# Patient Record
Sex: FEMALE | Race: WHITE | NOT HISPANIC OR LATINO | Employment: OTHER | URBAN - NONMETROPOLITAN AREA
[De-identification: names, ages, dates, MRNs, and addresses within clinical notes are randomized per-mention and may not be internally consistent; named-entity substitution may affect disease eponyms.]

---

## 2017-05-12 ENCOUNTER — COMMUNICATION - GICH (OUTPATIENT)
Dept: PHARMACY | Facility: OTHER | Age: 62
End: 2017-05-12

## 2017-05-12 DIAGNOSIS — E78.00 PURE HYPERCHOLESTEROLEMIA: ICD-10-CM

## 2017-05-12 DIAGNOSIS — E03.9 HYPOTHYROIDISM: ICD-10-CM

## 2017-05-12 DIAGNOSIS — F34.1 DYSTHYMIC DISORDER: ICD-10-CM

## 2017-06-06 ENCOUNTER — COMMUNICATION - GICH (OUTPATIENT)
Dept: FAMILY MEDICINE | Facility: OTHER | Age: 62
End: 2017-06-06

## 2017-06-06 DIAGNOSIS — E03.9 HYPOTHYROIDISM: ICD-10-CM

## 2017-06-06 DIAGNOSIS — F34.1 DYSTHYMIC DISORDER: ICD-10-CM

## 2017-08-08 ENCOUNTER — HISTORY (OUTPATIENT)
Dept: FAMILY MEDICINE | Facility: OTHER | Age: 62
End: 2017-08-08

## 2017-08-08 ENCOUNTER — OFFICE VISIT - GICH (OUTPATIENT)
Dept: FAMILY MEDICINE | Facility: OTHER | Age: 62
End: 2017-08-08

## 2017-08-08 DIAGNOSIS — Z12.11 ENCOUNTER FOR SCREENING FOR MALIGNANT NEOPLASM OF COLON: ICD-10-CM

## 2017-08-08 DIAGNOSIS — F34.1 DYSTHYMIC DISORDER: ICD-10-CM

## 2017-08-08 DIAGNOSIS — E03.9 HYPOTHYROIDISM: ICD-10-CM

## 2017-08-08 DIAGNOSIS — Z23 ENCOUNTER FOR IMMUNIZATION: ICD-10-CM

## 2017-08-08 DIAGNOSIS — E78.00 PURE HYPERCHOLESTEROLEMIA: ICD-10-CM

## 2017-08-08 DIAGNOSIS — Z12.4 ENCOUNTER FOR SCREENING FOR MALIGNANT NEOPLASM OF CERVIX: ICD-10-CM

## 2017-08-08 DIAGNOSIS — Z00.00 ENCOUNTER FOR GENERAL ADULT MEDICAL EXAMINATION WITHOUT ABNORMAL FINDINGS: ICD-10-CM

## 2017-08-08 LAB
A/G RATIO - HISTORICAL: 1.4 (ref 1–2)
ALBUMIN SERPL-MCNC: 4.2 G/DL (ref 3.5–5.7)
ALP SERPL-CCNC: 54 IU/L (ref 34–104)
ALT (SGPT) - HISTORICAL: 22 IU/L (ref 7–52)
ANION GAP - HISTORICAL: 8 (ref 5–18)
AST SERPL-CCNC: 22 IU/L (ref 13–39)
BILIRUB SERPL-MCNC: 0.6 MG/DL (ref 0.3–1)
BUN SERPL-MCNC: 17 MG/DL (ref 7–25)
BUN/CREAT RATIO - HISTORICAL: 22
CALCIUM SERPL-MCNC: 9.4 MG/DL (ref 8.6–10.3)
CHLORIDE SERPLBLD-SCNC: 100 MMOL/L (ref 98–107)
CHOL/HDL RATIO - HISTORICAL: 3.25
CHOLESTEROL TOTAL: 185 MG/DL
CO2 SERPL-SCNC: 27 MMOL/L (ref 21–31)
CREAT SERPL-MCNC: 0.77 MG/DL (ref 0.7–1.3)
GFR IF NOT AFRICAN AMERICAN - HISTORICAL: >60 ML/MIN/1.73M2
GLOBULIN - HISTORICAL: 3 G/DL (ref 2–3.7)
GLUCOSE SERPL-MCNC: 93 MG/DL (ref 70–105)
HDLC SERPL-MCNC: 57 MG/DL (ref 23–92)
LDLC SERPL CALC-MCNC: 109 MG/DL
NON-HDL CHOLESTEROL - HISTORICAL: 128 MG/DL
PATIENT STATUS - HISTORICAL: ABNORMAL
POTASSIUM SERPL-SCNC: 4.2 MMOL/L (ref 3.5–5.1)
PROT SERPL-MCNC: 7.2 G/DL (ref 6.4–8.9)
SODIUM SERPL-SCNC: 135 MMOL/L (ref 133–143)
TRIGL SERPL-MCNC: 95 MG/DL
TSH - HISTORICAL: 2.41 UIU/ML (ref 0.34–5.6)

## 2017-08-09 ENCOUNTER — COMMUNICATION - GICH (OUTPATIENT)
Dept: SURGERY | Facility: OTHER | Age: 62
End: 2017-08-09

## 2017-08-09 DIAGNOSIS — Z12.11 ENCOUNTER FOR SCREENING FOR MALIGNANT NEOPLASM OF COLON: ICD-10-CM

## 2017-08-11 ENCOUNTER — HISTORY (OUTPATIENT)
Dept: RADIOLOGY | Facility: OTHER | Age: 62
End: 2017-08-11

## 2017-08-11 ENCOUNTER — HOSPITAL ENCOUNTER (OUTPATIENT)
Dept: RADIOLOGY | Facility: OTHER | Age: 62
End: 2017-08-11
Attending: FAMILY MEDICINE

## 2017-08-11 DIAGNOSIS — Z12.31 ENCOUNTER FOR SCREENING MAMMOGRAM FOR MALIGNANT NEOPLASM OF BREAST: ICD-10-CM

## 2017-08-14 ENCOUNTER — AMBULATORY - GICH (OUTPATIENT)
Dept: RADIOLOGY | Facility: OTHER | Age: 62
End: 2017-08-14

## 2017-08-14 DIAGNOSIS — R92.8 OTHER ABNORMAL AND INCONCLUSIVE FINDINGS ON DIAGNOSTIC IMAGING OF BREAST: ICD-10-CM

## 2017-08-17 LAB — HPV RESULTS - HISTORICAL: NEGATIVE

## 2017-08-18 ENCOUNTER — HISTORY (OUTPATIENT)
Dept: RADIOLOGY | Facility: OTHER | Age: 62
End: 2017-08-18

## 2017-08-18 ENCOUNTER — HOSPITAL ENCOUNTER (OUTPATIENT)
Dept: RADIOLOGY | Facility: OTHER | Age: 62
End: 2017-08-18
Attending: FAMILY MEDICINE

## 2017-08-18 DIAGNOSIS — R92.8 OTHER ABNORMAL AND INCONCLUSIVE FINDINGS ON DIAGNOSTIC IMAGING OF BREAST: ICD-10-CM

## 2017-10-30 ENCOUNTER — COMMUNICATION - GICH (OUTPATIENT)
Dept: FAMILY MEDICINE | Facility: OTHER | Age: 62
End: 2017-10-30

## 2017-11-28 ENCOUNTER — AMBULATORY - GICH (OUTPATIENT)
Dept: FAMILY MEDICINE | Facility: OTHER | Age: 62
End: 2017-11-28

## 2017-11-28 DIAGNOSIS — Z23 ENCOUNTER FOR IMMUNIZATION: ICD-10-CM

## 2017-12-08 ENCOUNTER — SURGERY (OUTPATIENT)
Dept: SURGERY | Facility: OTHER | Age: 62
End: 2017-12-08

## 2017-12-08 ENCOUNTER — HOSPITAL ENCOUNTER (OUTPATIENT)
Dept: SURGERY | Facility: OTHER | Age: 62
Discharge: HOME | End: 2017-12-08
Attending: SURGERY | Admitting: SURGERY

## 2017-12-08 ENCOUNTER — HISTORY (OUTPATIENT)
Dept: SURGERY | Facility: OTHER | Age: 62
End: 2017-12-08

## 2017-12-08 ENCOUNTER — TRANSFERRED RECORDS (OUTPATIENT)
Dept: MULTI SPECIALTY CLINIC | Facility: CLINIC | Age: 62
End: 2017-12-08

## 2017-12-13 ENCOUNTER — HISTORY (OUTPATIENT)
Dept: SURGERY | Facility: OTHER | Age: 62
End: 2017-12-13

## 2017-12-13 ENCOUNTER — COMMUNICATION - GICH (OUTPATIENT)
Dept: SURGERY | Facility: OTHER | Age: 62
End: 2017-12-13

## 2017-12-27 NOTE — PROGRESS NOTES
Patient Information     Patient Name MRN Sex Jennifer Londono [id number] Female 1955      Progress Notes by Eulalia Delgado MD at 2017  8:30 AM     Author:  Eulalia Delgado MD Service:  (none) Author Type:  Physician     Filed:  2017  1:01 PM Encounter Date:  2017 Status:  Signed     :  Eulalia Delgado MD (Physician)            SUBJECTIVE:    Jennifer Miller is a 61 y.o. female who presents for a physical and follow up for medication refills.  She has been doing well.  She still struggles with her weight, but knows that it is because she enjoys eating.  She is walking 5 miles a day.  She otherwise has no other concerns.    HPI  I personally reviewed medications/allergies/history listed below:      Allergies     Allergen  Reactions     Pcn [Penicillins] *Unknown - Pt Doesn't Remember     Sulfa (Sulfonamide Antibiotics) Hives   ,   Family History       Problem   Relation Age of Onset     Other  Brother      obese        Good Health  Brother      Heart Disease  Mother      Other  Father      Cancer-breast  Paternal Aunt    ,   Current Outpatient Prescriptions on File Prior to Visit       Medication  Sig Dispense Refill     ascorbic acid chewable (VITAMIN C) 500 mg tablet Take 500 mg by mouth once daily.       No current facility-administered medications on file prior to visit.    ,   Past Medical History:     Diagnosis  Date     H/O pregnancy     G2, P2      Hypothyroidism    ,   Patient Active Problem List     Diagnosis  Code     ANXIETY DEPRESSION F34.1     WEIGHT GAIN R63.5     HEADACHE R51     DYSFUNCTIONAL UTERINE BLEEDING N94.9     HYPOTHYROIDISM E03.9     Hyperlipidemia E78.5    and   Past Surgical History:      Procedure  Laterality Date     COLONOSCOPY      10 year f/u        Uterine ablation       Social History     Social History        Marital status:       Spouse name: N/A     Number of children:  N/A     Years of education:  N/A  "    Occupational History      Not on file.     Social History Main Topics          Smoking status:   Never Smoker      Smokeless tobacco:   Never Used      Alcohol use   Yes      Comment: a glass of wine every night       Drug use:   No      Sexual activity:   Yes      Partners:  Male      Birth control/ protection:  Surgical      Other Topics  Concern     Not on file      Social History Narrative     .  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  2 sons.    [facility] in [place], [place]      REVIEW OF SYSTEMS:  Review of Systems   Constitutional: Negative for malaise/fatigue and weight loss.   Cardiovascular: Negative for leg swelling.   Gastrointestinal: Negative for constipation and diarrhea.   All other systems reviewed and are negative.      OBJECTIVE:  /60  Pulse 72  Ht 1.727 m (5' 8\")  Wt 83.9 kg (185 lb)  BMI 28.13 kg/m2    EXAM:   Physical Exam   Constitutional: She is oriented to person, place, and time and well-developed, well-nourished, and in no distress.   HENT:   Head: Normocephalic and atraumatic.   Right Ear: External ear normal.   Left Ear: External ear normal.   Nose: Nose normal.   Mouth/Throat: Oropharynx is clear and moist.   Eyes: Pupils are equal, round, and reactive to light. No scleral icterus.   Neck: Normal range of motion. Neck supple. No thyromegaly present.   Cardiovascular: Normal rate, regular rhythm, normal heart sounds and intact distal pulses.  Exam reveals no gallop and no friction rub.    No murmur heard.  Pulmonary/Chest: Effort normal and breath sounds normal. No respiratory distress. She has no wheezes. She has no rales. She exhibits no tenderness.   Breast exam:  No masses palpable.  No skin changes, tethering or axillary lymphadenopathy.   Abdominal: Soft. Bowel sounds are normal.   Genitourinary: Vagina normal, uterus normal, cervix normal, right " adnexa normal and left adnexa normal.   Genitourinary Comments: Pap completed.  Rectal exam showed normal sphincter tone without masses palpable.   Musculoskeletal: Normal range of motion. She exhibits no edema.   Lymphadenopathy:     She has no cervical adenopathy.   Neurological: She is alert and oriented to person, place, and time. She has normal reflexes. No cranial nerve deficit.   Skin: Skin is warm and dry.   Psychiatric: Affect normal.   PHQ Depression Screen     Over the last 2 weeks, how often have you been bothered by any of the following problems?  1. Little interest or pleasure in doing things: 0 - Not at all  2. Feeling down, depressed, or hopeless: 0 - Not at all                                               ASSESSMENT/PLAN:    ICD-10-CM    1. Hypothyroidism, unspecified type E03.9 levothyroxine (SYNTHROID) 88 mcg tablet      TSH      TSH   2. Pure hypercholesterolemia E78.00 simvastatin (ZOCOR) 20 mg tablet      LIPID PANEL      COMP METABOLIC PANEL      LIPID PANEL      COMP METABOLIC PANEL   3. ANXIETY DEPRESSION F34.1 PARoxetine (PAXIL) 10 mg tablet   4. Need for Zostavax administration Z23 zoster vaccine live, PF, (ZOSTAVAX, PF,) 19,400 unit/0.65 mL injection   5. Screen for colon cancer Z12.11 COLONOSCOPY SCREENING-GICH   6. Screening for cervical cancer Z12.4 GYN THIN PREP PAP SCREEN IMAGED      GYN THIN PREP PAP SCREEN IMAGED      CANCELED: GYN THIN PREP PAP SCREEN IMAGED   7. Health care maintenance Z00.00         Plan:    1.  Check TSH today.  Synthroid refilled.  2.  Simvastatin refilled today.  Check lipids and comprehensive metabolic profile as above.  3.  Stable.  She prefers to stay on her current dose of paxil for now.  4.  Prescription given to obtain zostavax at outside pharmacy once she turns 62.  5.  Referred for colonoscopy with Dr. Cordero for screening purposes.  6.  Pap smear completed as above.  7.  Mammogram is already scheduled.  DEXA 2 years ago was normal.  Other vaccines  are up to date.  Eulalia Delgado MD ....................  8/8/2017   1:01 PM

## 2017-12-28 NOTE — TELEPHONE ENCOUNTER
Patient Information     Patient Name MRN Jennifer Maldonado [id number] Female 1955      Telephone Encounter by Laquita Zimmer at 2017 10:38 AM     Author:  Laquita Zimmer Service:  (none) Author Type:  (none)     Filed:  2017 10:39 AM Encounter Date:  2017 Status:  Signed     :  Laquita Zimmer            Left message that the prescriptions were refilled.  Laquita Zimmer CMA (AAMA)................ 2017 10:38 AM

## 2017-12-28 NOTE — TELEPHONE ENCOUNTER
Patient Information     Patient Name MRN Jennifer Maldonado [id number] Female 1955      Telephone Encounter by Liss Styles at 10/30/2017  2:17 PM     Author:  Liss Styles Service:  (none) Author Type:  (none)     Filed:  10/30/2017  2:19 PM Encounter Date:  10/30/2017 Status:  Signed     :  Liss Styles            [facility]-Pt would like order for zostavax and wonders how far apart between flu and zostavax injections?    Liss Styles ....................  10/30/2017   2:19 PM

## 2017-12-28 NOTE — TELEPHONE ENCOUNTER
Patient Information     Patient Name MRN Jennifer Maldonado [id number] Female 1955      Telephone Encounter by Junie Diaz at 2017  9:00 AM     Author:  Junie Diaz Service:  (none) Author Type:  (none)     Filed:  2017  9:03 AM Encounter Date:  2017 Status:  Signed     :  Junie Diaz            Screening Questions for the Scheduling of Screening Colonoscopies   (If Colonoscopy is diagnostic, Provider should review the chart before scheduling.)  Are you younger than 50 or older than 80?  NO   Do you take aspirin or fish oil?  NO (if yes, tell patient to stop 1 week prior to Colonoscopy)  Do you take warfarin (Coumadin), clopidogrel (Plavix), apixaban (Eliquis), dabigatram (Pradaxa), rivaroxaban (Xarelto) or any blood thinner? NO  Do you use oxygen at home?  NO  Do you have kidney disease? NO  Are you on dialysis? NO  Have you had a stroke or heart attack in the last year? NO  Have you had a stent in your heart or any blood vessel in the last year? NO  Have you had a transplant of any organ? NO  Have you had a colonoscopy or upper endoscopy (EGD) before? YES          When?   - [facility]   Date of scheduled Colonoscopy. 2017  Provider GARDUNO   [facility]

## 2017-12-28 NOTE — ADDENDUM NOTE
Patient Information     Patient Name MRN Jennifer Maldonado [id number] Female 1955      Addendum Note by Dayana Kwok at 8/15/2017  9:55 AM     Author:  Dayana Kwok Service:  (none) Author Type:  (none)     Filed:  8/15/2017  9:55 AM Encounter Date:  2017 Status:  Signed     :  Dayana Kwok       Addended by: DAYANA KWOK on: 8/15/2017 09:55 AM        Modules accepted: Orders

## 2017-12-28 NOTE — PROGRESS NOTES
Patient Information     Patient Name MRN Jennifer Maldonado [id number] Female 1955      Progress Notes by Kelley Reyez at 2017  7:26 AM     Author:  Kelley Reyez Service:  (none) Author Type:  (none)     Filed:  2017  7:26 AM Date of Service:  2017  7:26 AM Status:  Signed     :  Kelley Reyez            Falls Risk Criteria:    Age 65 and older or under age 4        Sensory deficits    Poor vision    Use of ambulatory aides    Impaired judgment    Unable to walk independently    Meets High Risk criteria for falls:  no

## 2017-12-28 NOTE — TELEPHONE ENCOUNTER
Patient Information     Patient Name MRN Jennifer Maldonado [id number] Female 1955      Telephone Encounter by Agnes Burdick at 10/30/2017  3:57 PM     Author:  Agnes Burdick Service:  (none) Author Type:  (none)     Filed:  10/30/2017  3:57 PM Encounter Date:  10/30/2017 Status:  Signed     :  Agnes Burdick            Left message to call back.   Agnes Burdick LPN.................. 10/30/2017 3:57 PM

## 2017-12-28 NOTE — TELEPHONE ENCOUNTER
Patient Information     Patient Name MRN Jennifer Maldonado [id number] Female 1955      Telephone Encounter by Priya Michael at 2017 10:17 AM     Author:  Priya Michael Service:  (none) Author Type:  (none)     Filed:  2017 10:17 AM Encounter Date:  10/30/2017 Status:  Signed     :  Priya Michael            Patient called back and was informed of information and transferred to scheduling.  Priya Michael LPN .............2017  10:17 AM'

## 2017-12-28 NOTE — TELEPHONE ENCOUNTER
Patient Information     Patient Name MRN Jennifer Maldonado [id number] Female 1955      Telephone Encounter by Melissa Clifton at 2017  9:05 AM     Author:  Melissa Clifton Service:  (none) Author Type:  (none)     Filed:  2017  9:07 AM Encounter Date:  2017 Status:  Signed     :  Melissa Clifton            [facility] - PATIENT SCHEDULED FOR PX AND MED RENEWAL FOR 17. PATIENT NEEDS REFILL FOR THYROID MED AND PAXIL. PATIENT STATED THAT THEY HAVE ABOUT 6 LEFT.    Melissa Clifton ....................  2017   9:06 AM

## 2017-12-28 NOTE — TELEPHONE ENCOUNTER
Patient Information     Patient Name MRN Jennifer Maldonado [id number] Female 1955      Telephone Encounter by Agnes Burdick at 11/3/2017  3:44 PM     Author:  Agnes Burdick Service:  (none) Author Type:  (none)     Filed:  11/3/2017  3:44 PM Encounter Date:  10/30/2017 Status:  Signed     :  Agnes Burdick            Left message to call back.   Agnes Burdick LPN.................. 11/3/2017 3:44 PM

## 2017-12-30 NOTE — NURSING NOTE
Patient Information     Patient Name MRN Jennifer Maldonado [id number] Female 1955      Nursing Note by Ileana Aquino at 2017  8:30 AM     Author:  Ileana Aquino Service:  (none) Author Type:  (none)     Filed:  2017  8:49 AM Encounter Date:  2017 Status:  Signed     :  Ileana Aquino            Patient is here for physical and medication refills.  Ileana Aquino LPN ....................2017  8:35 AM

## 2018-01-05 NOTE — TELEPHONE ENCOUNTER
Patient Information     Patient Name MRN Jennifer Maldonado [id number] Female 1955      Telephone Encounter by Eulalia Delgado MD at 2017  3:31 PM     Author:  Eulalia Delgado MD Service:  (none) Author Type:  Physician     Filed:  2017  3:32 PM Encounter Date:  2017 Status:  Signed     :  Eulalia Delgado MD (Physician)            Refills completed today.  Eulalia Delgado MD ....................  2017   3:32 PM

## 2018-01-27 VITALS
HEART RATE: 72 BPM | SYSTOLIC BLOOD PRESSURE: 112 MMHG | BODY MASS INDEX: 28.04 KG/M2 | DIASTOLIC BLOOD PRESSURE: 60 MMHG | WEIGHT: 185 LBS | HEIGHT: 68 IN

## 2018-02-12 NOTE — TELEPHONE ENCOUNTER
Patient Information     Patient Name MRN Jennifer Maldonado [id number] Female 1955      Telephone Encounter by Chloe eLija at 2017 10:38 AM     Author:  Chloe Leija Service:  (none) Author Type:  (none)     Filed:  2017 10:39 AM Encounter Date:  2017 Status:  Signed     :  Chloe Leija            Patient notified of results.  Chloe Leija LPN.......................... 2017  10:39 AM

## 2018-02-17 ENCOUNTER — DOCUMENTATION ONLY (OUTPATIENT)
Dept: FAMILY MEDICINE | Facility: OTHER | Age: 63
End: 2018-02-17

## 2018-02-17 PROBLEM — N92.5 OTHER DISORDER OF MENSTRUATION AND OTHER ABNORMAL BLEEDING FROM FEMALE GENITAL TRACT: Status: ACTIVE | Noted: 2018-02-17

## 2018-02-17 PROBLEM — F34.1 DYSTHYMIC DISORDER: Status: ACTIVE | Noted: 2018-02-17

## 2018-02-17 PROBLEM — N93.8 OTHER DISORDER OF MENSTRUATION AND OTHER ABNORMAL BLEEDING FROM FEMALE GENITAL TRACT: Status: ACTIVE | Noted: 2018-02-17

## 2018-02-17 PROBLEM — R51.9 HEADACHE: Status: ACTIVE | Noted: 2018-02-17

## 2018-02-17 PROBLEM — E03.9 HYPOTHYROIDISM: Status: ACTIVE | Noted: 2018-02-17

## 2018-02-17 PROBLEM — R63.5 WEIGHT GAIN: Status: ACTIVE | Noted: 2018-02-17

## 2018-02-17 PROBLEM — Z12.11 SPECIAL SCREENING FOR MALIGNANT NEOPLASMS, COLON: Status: ACTIVE | Noted: 2017-12-08

## 2018-02-17 RX ORDER — SIMVASTATIN 20 MG
1 TABLET ORAL AT BEDTIME
COMMUNITY
Start: 2017-08-08 | End: 2018-08-17

## 2018-02-17 RX ORDER — PAROXETINE 10 MG/1
1.5 TABLET, FILM COATED ORAL DAILY
COMMUNITY
Start: 2017-08-08 | End: 2018-07-30

## 2018-02-17 RX ORDER — LEVOTHYROXINE SODIUM 88 UG/1
1 TABLET ORAL DAILY
COMMUNITY
Start: 2017-08-08 | End: 2018-08-17

## 2018-02-23 NOTE — H&P
Patient Information     Patient Name MRN Jennifer Maldonado [id number] Female 1955      H&P by Dulce Maria Cordero MD at 2017  1:24 PM     Author:  Dulce Maria Cordero MD Service:  (none) Author Type:  Physician     Filed:  2017  1:24 PM Date of Service:  2017  1:24 PM Status:  Signed     :  Dulce Maria Cordero MD (Physician)            PRE-PROCEDURE NOTE    CHIEF COMPLAINT / REASON FOR PROCEDURE:  Need for screening colonoscopy.    PERTINENT HISTORY   Patient with no complaints. Previous colonoscopy -no polyps. No diarrhea, constipation, abdominal pain or rectal bleeding. No family history of colon polyps or colon cancer.    Past Medical History:     Diagnosis  Date     H/O pregnancy     G2, P2      Hypothyroidism      Past Surgical History:      Procedure  Laterality Date     COLONOSCOPY      10 year f/u        Uterine ablation       Other:  None  Bleeding tendencies:  No    Relevant Family History:  None    Relevant Social History:  None    A relevant review of systems was performed and was negative.    ALLERGIES/SENSITIVITIES:   Allergies     Allergen  Reactions     Pcn [Penicillins] *Unknown - Pt Doesn't Remember     Sulfa (Sulfonamide Antibiotics) Hives        CURRENT MEDICATIONS:    Current Facility-Administered Medications        Medication  Dose Route Frequency Last Rate     lactated Ringers infusion  25 mL/hr Intravenous continuous 25 mL/hr (17 1310)     lidocaine (1%) injection 0.1-1 mL  0.1-1 mL Intra-Dermal one time prn       sodium chloride 0.9% 5 mL syringe (NORMAL SALINE)  5 mL Intravenous Each Time PRN        Prior to Admission medications          Medication Sig Start Date End Date Taking? Last Dose Authorizing Provider   ascorbic acid chewable (VITAMIN C) 500 mg tablet Take 500 mg by mouth once daily.     Reported, Patient   levothyroxine (SYNTHROID) 88 mcg tablet Take 1 tablet by mouth once daily. 17 at Unknown time Eulalia Delgado MD    PARoxetine (PAXIL) 10 mg tablet TAKE ONE AND ONE-HALF TABLETS BY MOUTH ONCE DAILY. 8/8/17 12/7/2017 at Unknown time Eulalia Delgado MD   simvastatin (ZOCOR) 20 mg tablet Take 1 tablet by mouth at bedtime. 8/8/17 12/7/2017 at Unknown time Eulalia Delgado MD       PRE-SEDATION ASSESSMENT:    Lung Exam:  Normal  Heart Exam:  Normal    Comment(s):      IMPRESSION:  Need for screening colonoscopy.    PLAN:  I discussed screening colonoscopy with the patient..    Dulce Maria Cordero MD

## 2018-02-23 NOTE — PROCEDURES
Patient Information     Patient Name MRN Jennifer Maldonado [id number] Female 1955      Procedures by Dulce Maria Cordero MD at 2017  2:08 PM     Author:  Dulce Maria Cordero MD Service:  (none) Author Type:  Physician     Filed:  2017  2:10 PM Date of Service:  2017  2:08 PM Status:  Signed     :  Dulce Maria Cordero MD (Physician)        Pre-procedure Diagnoses:    1. Special screening for malignant neoplasms, colon [Z12.11]           Post-procedure Diagnoses:    1. Polyp of transverse colon, unspecified type [D12.3]           Procedures:    1. GA COLONOSCOPY REMOVE FRANKLIN POLYP LESN HOT BPSY FORCEP [57878.0]               PROCEDURE NOTE    SURGEON: Dulce Maria Cordero MD.    PRE-OP DIAGNOSIS:  Screening Colonoscopy      POST-OP DIAGNOSIS: colon polyp     Polyp location:  transverse colon Size:  0.5 mm Removed?: yes-2 bites hot forceps    PROCEDURE:  Colonoscopy with polypectomy hot forceps    ESTIMATED BLOOD LOSS: none    COMPLICATIONS:  None    SPECIMEN:  Transverse colon polyp    ANESTHESIA:  See anesthesia note    INDICATION FOR THE PROCEDURE: The patient is a 62 y.o. female. The patient has no complaints. I explained to the patient the risks, benefits and alternatives to screening colonoscopy for evaluating the colon for colon polyps and colon cancer. We specifically discussed the risks of bleeding, infection, perforation, potential inability to reach the cecum and the risks of sedation. The patient's questions were answered and the patient wished to proceed. Informed consent paperwork was completed.    PROCEDURE: The patient was taken to the endoscopy suite. Appropriate monitors were attached. The patient was placed in the left lateral decubitus position.Timeout was performed confirming the patient's identity and procedure to be performed.  After appropriate sedation was confirmed, digital rectal exam was performed.  There was normal tone and no gross abnormality was noted. The lubricated  colonoscope was introduced into the anus the colon was insufflated with air. The prep quality was adequate. Under direct visualization the scope was advanced to the cecum. The ileocecal valve was intubated and the terminal ileum inspected. No gross abnormality was noted. The scope was withdrawn back into the cecum. The mucosa of colon was inspected while withdrawing the scope. A sessile polyp was noted in the transverse colon and removed with hot forceps in 2 bites. The scope was retroflexed in the rectum and the anorectal junction was inspected. No abnormalities were noted. The scope was returned to a neutral position and the colon was decompressed. The scope was removed. The patient tolerated the procedure with no immediately apparent complication. The patient was taken to recovery in stable condition.      FOLLOW UP:  RECOMMEND high fiber diet, follow up: will call with pathology results.    Dulce Maria Cordero MD

## 2018-02-23 NOTE — OR ANESTHESIA
Patient Information     Patient Name MRN Sex     Jennifer Miller [id number] Female 1955      OR Anesthesia by Agapito Mora CRNA at 2017  3:03 PM     Author:  Agapito Mora CRNA Service:  (none) Author Type:  NURS- Nurse Anesthetist     Filed:  2017  3:03 PM Date of Service:  2017  3:03 PM Status:  Signed     :  Agapito Mora CRNA (NURS- Nurse Anesthetist)            Anesthesia Post Operative Care Note    Name: Jennifer Miller  MRN:   [id number]  :    1955       Procedure Done:  See Surgeon Note        Anesthesia Technique    Anesthetic Type:  MAC       MAC Type:  NC     Oral Trauma:  No    Intraoperative Course   Hemodynamics:  Stable    Ventilation Normal:  Yes Lung Sounds:  Normal      PACU Course    Airway Status:  Extubated     Nondepolarizer Used:       Reversed: N/A   Hemodynamics:  Stable      Hydration: Euvolemic   Temperature:  36.1 - 38.3      Mental Status:  Awake, alert, follows commands   Pain Management:  Adequate   Regional Block:  No      Vital Signs:  Temp: 98.2  F (36.8  C)  Pulse: 55  BP: 105/50  Resp: 14  SpO2: 96 %    O2 Device: Room Air         Level of Nausea: None        Active Lines:  Patient Lines/Drains/Airways Status    Active Line     Name: Placement date: Placement time: Site: Days:    PERIPHERAL VAD Left Hand 22 17   1310   Hand   less than 1                Intake & Output:       Labs:  No results for input(s): DQ9GHYCDZKO, FCL0OLSYOOTY, PHARTERIAL, ZRI7XZKGWCQM, R1JHFWWGKFPS in the last 24 hours.    No results for input(s): MAGNESIUM in the last 24 hours.    No results for input(s): GLUCOSEMETER in the last 720 hours.        Agapito Mora CRNA ....................  2017   3:03 PM

## 2018-02-23 NOTE — OR ANESTHESIA
Patient Information     Patient Name MRN Sex Jennifer Londono [id number] Female 1955      OR Anesthesia by Agapito Mora CRNA at 2017  1:01 PM     Author:  Agapito Mora CRNA Service:  (none) Author Type:  NURS- Nurse Anesthetist     Filed:  2017  1:01 PM Date of Service:  2017  1:01 PM Status:  Signed     :  Agapito Mora CRNA (NURS- Nurse Anesthetist)                                                           ANESTHESIA ASSESSMENT    Date: 17 Time: 1:01 PM      Patient:  Jennifer Miller    Procedure(s) (LRB):  COLONOSCOPY (N/A)    Past Medical History:     Diagnosis  Date     H/O pregnancy     G2, P2      Hypothyroidism        Past Surgical History:      Procedure  Laterality Date     COLONOSCOPY      10 year f/u        Uterine ablation         Family History       Problem   Relation Age of Onset     Other  Brother      obese        Good Health  Brother      Heart Disease  Mother      Other  Father      Cancer-breast  Paternal Aunt        Patient Active Problem List     Diagnosis  Code     ANXIETY DEPRESSION F34.1     WEIGHT GAIN R63.5     HEADACHE R51     DYSFUNCTIONAL UTERINE BLEEDING N94.9     HYPOTHYROIDISM E03.9     Hyperlipidemia E78.5     Special screening for malignant neoplasms, colon Z12.11       Prescriptions Prior to Admission       Medication  Sig Dispense Refill     ascorbic acid chewable (VITAMIN C) 500 mg tablet Take 500 mg by mouth once daily.       levothyroxine (SYNTHROID) 88 mcg tablet Take 1 tablet by mouth once daily. 90 tablet 3     PARoxetine (PAXIL) 10 mg tablet TAKE ONE AND ONE-HALF TABLETS BY MOUTH ONCE DAILY. 135 tablet 3     simvastatin (ZOCOR) 20 mg tablet Take 1 tablet by mouth at bedtime. 90 tablet 3       Allergies:  Allergies     Allergen  Reactions     Pcn [Penicillins] *Unknown - Pt Doesn't Remember     Sulfa (Sulfonamide Antibiotics) Hives       Review of Systems:  GERD: No  Chest pain: No  Shortness of breath:  "No  Recent fever: No  Poor exercise tolerance: No  Bleeding tendency: No  Pregnant: No  Anesthesia Complications: None      History    Smoking Status      Never Smoker   Smokeless Tobacco      Never Used     Social History     Social History        Marital status:       Spouse name: N/A     Number of children:  N/A     Years of education:  N/A     Social History Main Topics          Smoking status:   Never Smoker      Smokeless tobacco:   Never Used      Alcohol use   Yes      Comment: a glass of wine every night       Drug use:   No      Sexual activity:   Yes      Partners:  Male      Birth control/ protection:  Surgical      Other Topics  Concern     None      Social History Narrative     .  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  [facility].    [facility] in [place], [place]       Physical Examination:  /74  Pulse 58  Temp 97  F (36.1  C)  Resp 14  Ht 1.727 m (5' 8\")  Wt 79.8 kg (176 lb)  BMI 26.76 kg/m2 Body mass index is 26.76 kg/(m^2). Body surface area is 1.96 meters squared.  Dental Condition: Excellent     Mallampati Score (Airway): I  Cardiovascular: Normal  Pulmonary: Normal  Other: (not recorded)    [facility]:    No results for input(s): SODIUM, POTASSIUM, CHLORIDE, BA9FUULN, ANIONGAP, BUN, CREATININE, BUNCREARATIO, CALCIUM, GLUCOSE, GLUCOSEMETER, KETONES, MAGNESIUM, WBC, HGB, HCT, PLT, ABORH, RHTYPE, PREGURINE, BHCGQL, HCGBETAQUANT, INR in the last 72 hours.          Assessment/Plan:  ASA Class: I  Risk of dental injury discussed: Yes  NPO status confirmed: Yes  Anesthetic Plan: MAC  Risk/Benefit/Alt discussed: Yes  Questions answered: Yes  Emergency Case?: No  Labs/ECG/Radiology Reviewed?: Yes      H&P Reviewed.  Patient Examined.      Provider Electronic Signature:  Agapito Mora CRNA                "

## 2018-02-23 NOTE — OR POSTOP
Patient Information     Patient Name MRN Sex Jennifer Londono [id number] Female 1955      OR PostOp by Swetha Goodman RN at 2017  3:25 PM     Author:  Swetha Goodman RN Service:  (none) Author Type:  NURS- Registered Nurse     Filed:  2017  3:26 PM Date of Service:  2017  3:25 PM Status:  Signed     :  Swetha Goodman RN (NURS- Registered Nurse)            Discharge Note    Data:  Jennifer Miller has been discharged home at 1515 via ambulatory accompanied by Registered Nurse.      Action:  Written discharge/follow-up instructions were provided to patient and Spouse. Prescriptions : None.  Belongings sent with patient. Medications from home sent with patient/family: Not Applicable  Equipment none .     Response:  Patient and Spouse verbalized understanding of discharge instructions, reason for discharge, and necessary follow-up appointments.   SWETHA GOODMAN RN ....................  2017   3:26 PM

## 2018-07-23 NOTE — PROGRESS NOTES
Patient Information     Patient Name  Jennifer Miller MRN  [id number] Sex  Female   1955      Letter by Dulce Maria Cordero MD at      Author:  Dulce Maria Cordero MD Service:  (none) Author Type:  (none)    Filed:   Encounter Date:  2017 Status:  (Other)           Jennifer Miller  [address]  [facility] [address]          2017    Dear Ms. Miller:    This letter is in regards to your colonoscopy that was done by Dulce Maria Cordero MD on 17.   The polyp removed from your colon was a TUBULAR ADENOMA.  Adenomatous polyps are  pre-cancerous, yet BENIGN.  The polyp was removed. You have an increased risk for developing other polyps in the future.  For that reason, Dr. Dulce Maria Cordero MD recommends a repeat colonoscopy in 5 years unless you have problems.      Dr. Dulce Maria Cordero MD also recommends a high fiber diet. A fiber supplement such as Metamucil, FiberCon, or Citrucel is recommended. Generic forms of these supplements are fine. These are available over the counter.       If you have any questions regarding this report, please call the Surgery department at [phone]  A copy of this report will be placed in your electronic medical record for your primary care provider's review.    Sincerely,  General Surgery      Reviewed and electronically signed by provider.

## 2018-07-23 NOTE — PROGRESS NOTES
Patient Information     Patient Name  Jennifer Miller MRN  [id number] Sex  Female   1955      Letter by Melissa Shrestha MD at      Author:  Melissa Shrestha MD Service:  (none) Author Type:  (none)    Filed:   Date of Service:   Status:  (Other)       [facility]  [address]  [facility] [address]  [phone]         Jennifer Miller   [address]  [facility] [address]      2017  Date of Breast Imagin2017  8:02 AM    Dear Ms. Miller:    Your recent breast imaging examination showed a finding that requires additional imaging studies for a complete evaluation. Most findings are benign (not cancer). Please call [phone] to schedule an appointment for these tests if you have not already done so.    A report of your results was sent to your health care provider(s).    Your images will become part of your medical file here at [facility] and will be available for your continuing care. You are responsible for informing any new health care provider or breast imaging facility of the date and location of this examination.    Although mammography is the most accurate method for early detection, not all cancers are found through mammography. If you notice any new changes in your breast(s) please inform your health care provider without delay.    Thank you for choosing [facility] to participate in your healthcare needs.       [facility] Recommendations for Early Breast Cancer Detection   in Women without Symptoms  When to start having mammograms to screen for breast cancer, and how often to have them, is a personal decision. It should be based on your preferences, your values and your risk for developing breast cancer. [facility] recommends that you and your health care provider together determine when mammograms are right for you.    [facility] recommends the following  guidelines for women who have an average risk for breast cancer, based on American Cancer Society guidelines:    Age 40 to 44: Mammograms are optional.     Age 45 to 54: Have a mammogram every year.    Age 55 and older: Have a mammogram every year, or transition to having one every 2 years. Continue to have mammograms as long as your health is good.    If you have a higher than average risk for breast cancer, your health care provider may recommend a different schedule.

## 2018-07-24 NOTE — PROGRESS NOTES
Patient Information     Patient Name  Jennifer Miller MRN  [id number] Sex  Female   1955      Letter by Mark Hinton MD at      Author:  Mark Hinton MD Service:  (none) Author Type:  (none)    Filed:   Date of Service:   Status:  (Other)       [facility]  [address]  [facility] [address]  [phone]         Jennifer Miller   [address]  [facility] [address]      2017  Date of Breast Imagin2017  1:23 PM    Dear Ms. Miller:    We are pleased to inform you that the result of your recent breast imaging examination is normal/benign (not cancer).    A report of your results was sent to your health care provider(s).    Your images will become part of your medical file here at [facility] and will be available for your continuing care. You are responsible for informing any new health care provider or breast imaging facility of the date and location of this examination.    Although mammography is the most accurate method for early detection, not all cancers are found through mammography. If you notice any new changes in your breast(s) please inform your health care provider without delay.    Thank you for choosing [facility] to participate in your healthcare needs.         [facility] Recommendations for Early Breast Cancer Detection   in Women without Symptoms  When to start having mammograms to screen for breast cancer, and how often to have them, is a personal decision. It should be based on your preferences, your values and your risk for developing breast cancer. [facility] recommends that you and your health care provider together determine when mammograms are right for you.    [facility] recommends the following guidelines for women who have an average risk for breast cancer, based on American Cancer Society guidelines:    Age 40 to 44:  Mammograms are optional.     Age 45 to 54: Have a mammogram every year.           Age 55 and older: Have a mammogram every year, or transition to having one every 2 years. Continue to have mammograms as long as your health is good.    If you have a higher than average risk for breast cancer, your health care provider may recommend a different schedule.

## 2018-07-30 DIAGNOSIS — F34.1 DYSTHYMIC DISORDER: Primary | ICD-10-CM

## 2018-08-01 RX ORDER — PAROXETINE 10 MG/1
TABLET, FILM COATED ORAL
Qty: 135 TABLET | Refills: 0 | Status: SHIPPED | OUTPATIENT
Start: 2018-08-01 | End: 2018-08-17

## 2018-08-01 NOTE — TELEPHONE ENCOUNTER
Paxil  LOV-08/08/2017    Future Office visit:    Next 5 appointments (look out 90 days)     Aug 17, 2018  9:00 AM CDT   PHYSICAL with Eulalia Delgado MD   [facility] ([facility])    [address]  [place] [address]   [phone]                   Due for exam.  Limited refill per protocol. Upcoming OV scheduled for 08/17/2018. Stefanie Dominguez ........   8/1/2018    10:16 AM

## 2018-08-17 ENCOUNTER — OFFICE VISIT (OUTPATIENT)
Dept: FAMILY MEDICINE | Facility: OTHER | Age: 63
End: 2018-08-17
Attending: FAMILY MEDICINE
Payer: COMMERCIAL

## 2018-08-17 VITALS
SYSTOLIC BLOOD PRESSURE: 100 MMHG | HEIGHT: 67 IN | BODY MASS INDEX: 24.61 KG/M2 | DIASTOLIC BLOOD PRESSURE: 64 MMHG | HEART RATE: 60 BPM | WEIGHT: 156.8 LBS

## 2018-08-17 DIAGNOSIS — Z00.00 HEALTH CARE MAINTENANCE: Primary | ICD-10-CM

## 2018-08-17 DIAGNOSIS — E78.00 PURE HYPERCHOLESTEROLEMIA: ICD-10-CM

## 2018-08-17 DIAGNOSIS — E03.9 HYPOTHYROIDISM, UNSPECIFIED TYPE: ICD-10-CM

## 2018-08-17 DIAGNOSIS — Z23 NEED FOR SHINGLES VACCINE: ICD-10-CM

## 2018-08-17 DIAGNOSIS — F34.1 DYSTHYMIC DISORDER: ICD-10-CM

## 2018-08-17 DIAGNOSIS — Z12.31 ENCOUNTER FOR SCREENING MAMMOGRAM FOR BREAST CANCER: ICD-10-CM

## 2018-08-17 LAB
ALBUMIN SERPL-MCNC: 4.6 G/DL (ref 3.5–5.7)
ALP SERPL-CCNC: 47 U/L (ref 34–104)
ALT SERPL W P-5'-P-CCNC: 15 U/L (ref 7–52)
ANION GAP SERPL CALCULATED.3IONS-SCNC: 7 MMOL/L (ref 3–14)
AST SERPL W P-5'-P-CCNC: 18 U/L (ref 13–39)
BILIRUB SERPL-MCNC: 0.7 MG/DL (ref 0.3–1)
BUN SERPL-MCNC: 15 MG/DL (ref 7–25)
CALCIUM SERPL-MCNC: 9.7 MG/DL (ref 8.6–10.3)
CHLORIDE SERPL-SCNC: 103 MMOL/L (ref 98–107)
CHOLEST SERPL-MCNC: 190 MG/DL
CO2 SERPL-SCNC: 30 MMOL/L (ref 21–31)
CREAT SERPL-MCNC: 0.8 MG/DL (ref 0.6–1.2)
GFR SERPL CREATININE-BSD FRML MDRD: 73 ML/MIN/1.7M2
GLUCOSE SERPL-MCNC: 98 MG/DL (ref 70–105)
HDLC SERPL-MCNC: 68 MG/DL (ref 23–92)
LDLC SERPL CALC-MCNC: 106 MG/DL
NONHDLC SERPL-MCNC: 122 MG/DL
POTASSIUM SERPL-SCNC: 4.4 MMOL/L (ref 3.5–5.1)
PROT SERPL-MCNC: 7.5 G/DL (ref 6.4–8.9)
SODIUM SERPL-SCNC: 140 MMOL/L (ref 134–144)
TRIGL SERPL-MCNC: 79 MG/DL
TSH SERPL DL<=0.05 MIU/L-ACNC: 3.08 IU/ML (ref 0.34–5.6)

## 2018-08-17 PROCEDURE — 84443 ASSAY THYROID STIM HORMONE: CPT | Performed by: FAMILY MEDICINE

## 2018-08-17 PROCEDURE — 99396 PREV VISIT EST AGE 40-64: CPT | Performed by: FAMILY MEDICINE

## 2018-08-17 PROCEDURE — 80053 COMPREHEN METABOLIC PANEL: CPT | Performed by: FAMILY MEDICINE

## 2018-08-17 PROCEDURE — 36415 COLL VENOUS BLD VENIPUNCTURE: CPT | Performed by: FAMILY MEDICINE

## 2018-08-17 PROCEDURE — 80061 LIPID PANEL: CPT | Performed by: FAMILY MEDICINE

## 2018-08-17 RX ORDER — SIMVASTATIN 20 MG
20 TABLET ORAL AT BEDTIME
Qty: 90 TABLET | Refills: 3 | Status: SHIPPED | OUTPATIENT
Start: 2018-08-17 | End: 2019-08-22

## 2018-08-17 RX ORDER — LEVOTHYROXINE SODIUM 88 UG/1
88 TABLET ORAL DAILY
Qty: 90 TABLET | Refills: 3 | Status: SHIPPED | OUTPATIENT
Start: 2018-08-17 | End: 2019-08-22

## 2018-08-17 RX ORDER — PAROXETINE 10 MG/1
15 TABLET, FILM COATED ORAL EVERY MORNING
Qty: 135 TABLET | Refills: 3 | Status: SHIPPED | OUTPATIENT
Start: 2018-08-17 | End: 2019-08-22

## 2018-08-17 ASSESSMENT — ENCOUNTER SYMPTOMS
FATIGUE: 0
COUGH: 0

## 2018-08-17 ASSESSMENT — PAIN SCALES - GENERAL: PAINLEVEL: NO PAIN (0)

## 2018-08-17 NOTE — LETTER
Jennifer Miller  [address] [address]    8/17/2018      Dear Ms. Miller,      I wanted to let you know about your recent labs. Your LDL cholesterol is very slightly elevated, but not to the point where your cholesterol medication needs to be changed.      All of your other labs were in good range.    Please contact us at [phone] with any questions or concerns that you have.    I have attached your lab results for your records.        Sincerely,         Eulalia Delgado     Resulted Orders   Comprehensive Metabolic Panel   Result Value Ref Range    Sodium 140 134 - 144 mmol/L    Potassium 4.4 3.5 - 5.1 mmol/L    Chloride 103 98 - 107 mmol/L    Carbon Dioxide 30 21 - 31 mmol/L    Anion Gap 7 3 - 14 mmol/L    Glucose 98 70 - 105 mg/dL    Urea Nitrogen 15 7 - 25 mg/dL    Creatinine 0.80 0.60 - 1.20 mg/dL    GFR Estimate 73 >60 mL/min/1.7m2    GFR Estimate If Black 89 >60 mL/min/1.7m2    Calcium 9.7 8.6 - 10.3 mg/dL    Bilirubin Total 0.7 0.3 - 1.0 mg/dL    Albumin 4.6 3.5 - 5.7 g/dL    Protein Total 7.5 6.4 - 8.9 g/dL    Alkaline Phosphatase 47 34 - 104 U/L    ALT 15 7 - 52 U/L    AST 18 13 - 39 U/L   Lipid Panel   Result Value Ref Range    Cholesterol 190 <200 mg/dL    Triglycerides 79 <150 mg/dL    HDL Cholesterol 68 23 - 92 mg/dL    LDL Cholesterol Calculated 106 (H) <100 mg/dL      Comment:      Above desirable:  100-129 mg/dl  Borderline High:  130-159 mg/dL  High:             160-189 mg/dL  Very high:       >189 mg/dl      Non HDL Cholesterol 122 <130 mg/dL   Thyrotropin GH   Result Value Ref Range    Thyrotropin 3.08 0.34 - 5.60 IU/mL

## 2018-08-17 NOTE — MR AVS SNAPSHOT
"              After Visit Summary   8/17/2018    Jennifer Miller    MRN: [id number]           Patient Information     Date Of Birth          1955        Visit Information        Provider Department      8/17/2018 9:00 AM Eulalia Delgado MD [facility]        Today's Diagnoses     Hypothyroidism, unspecified type    -  1    Dysthymic disorder        Pure hypercholesterolemia        Special screening for malignant neoplasms, colon        Need for shingles vaccine           Follow-ups after your visit        Future tests that were ordered for you today     Open Future Orders        Priority Expected Expires Ordered    MA Screen Bilateral w/Donte Routine  8/17/2019 8/17/2018    Comprehensive Metabolic Panel Routine  8/17/2019 8/17/2018    Lipid Panel Routine  8/17/2019 8/17/2018    Thyrotropin GH Routine 8/17/2018 8/17/2019 8/17/2018            Who to contact     If you have questions or need follow up information about today's clinic visit or your schedule please contact [facility] AND [facility] directly at [phone].  Normal or non-critical lab and imaging results will be communicated to you by Click[employer], letter or phone within 4 business days after the clinic has received the results. If you do not hear from us within 7 days, please contact the clinic through [employer] or phone. If you have a critical or abnormal lab result, we will notify you by phone as soon as possible.  Submit refill requests through [employer] or call your pharmacy and they will forward the refill request to us. Please allow 3 business days for your refill to be completed.          Additional Information About Your Visit        ClickScanSharehart Information     [employer] lets you send messages to your doctor, view your test results, renew your prescriptions, schedule appointments and more. To sign up, go to [email or web address] . Click on \"Log in\" on the left side of the screen, which will take you to the " "Welcome page. Then click on \"Sign up Now\" on the right side of the page.     You will be asked to enter the access code listed below, as well as some personal information. Please follow the directions to create your username and password.     Your access code is: [email or web address]  Expires: 11/15/2018  9:38 AM     Your access code will  in 90 days. If you need help or a new code, please call your [facility] clinic or [phone].        Care EveryWhere ID     This is your Care EveryWhere ID. This could be used by other organizations to access your [facility] medical records  [id number]        Your Vitals Were     Pulse Height BMI (Body Mass Index)             60 5' 7.25\" (1.708 m) 24.38 kg/m2          Blood Pressure from Last 3 Encounters:   18 100/64   17 112/60   06/10/16 118/70    Weight from Last 3 Encounters:   18 156 lb 12.8 oz (71.1 kg)   17 185 lb (83.9 kg)   06/10/16 177 lb 6.4 oz (80.5 kg)                 Today's Medication Changes          These changes are accurate as of 18  9:39 AM.  If you have any questions, ask your nurse or doctor.               Start taking these medicines.        Dose/Directions    other medical supplies   Used for:  Need for shingles vaccine   Started by:  Eulalia Delgado MD        Shingrix.  Diagnosis:  Z23.   Quantity:  1 each   Refills:  1         These medicines have changed or have updated prescriptions.        Dose/Directions    PARoxetine 10 MG tablet   Commonly known as:  PAXIL   This may have changed:  See the new instructions.   Used for:  Dysthymic disorder   Changed by:  Eulalia Delgado MD        Dose:  15 mg   Take 1.5 tablets (15 mg) by mouth every morning   Quantity:  135 tablet   Refills:  3         Stop taking these medicines if you haven't already. Please contact your care team if you have questions.     C-500 500 MG Chew   Generic drug:  Ascorbic Acid   Stopped by:  Eulalia Delgado MD              "   Where to get your medicines      These medications were sent to [facility] - [place] - [address]  [address] [address]     Phone:  [phone]     levothyroxine 88 MCG tablet    PARoxetine 10 MG tablet    simvastatin 20 MG tablet         Some of these will need a paper prescription and others can be bought over the counter.  Ask your nurse if you have questions.     Bring a paper prescription for each of these medications     other medical supplies                Primary Care Provider Office Phone # Fax #    Eulalia Lili Delgado [phone]       [address]   [facility] [address]        Equal Access to Services     STEFANI BERMUDEZ : Ally Mercedes, waprudence boyd, qajeanne kaalmada shanda, jeremy patel. So [facility] [phone].    ATENCIÓN: Si habla español, tiene a cleaning disposición servicios gratuitos de asistencia lingüística. Llame al [phone].    We comply with applicable federal civil rights laws and [place] laws. We do not discriminate on the basis of race, color, national origin, age, disability, sex, sexual orientation, or gender identity.            Thank you!     Thank you for choosing [facility] AND [facility]  for your care. Our goal is always to provide you with excellent care. Hearing back from our patients is one way we can continue to improve our services. Please take a few minutes to complete the written survey that you may receive in the mail after your visit with us. Thank you!             Your Updated Medication List - Protect others around you: Learn how to safely use, store and throw away your medicines at www.disposemymeds.org.          This list is accurate as of 8/17/18  9:39 AM.  Always use your most recent med list.                   Brand Name Dispense Instructions for use Diagnosis    levothyroxine 88 MCG tablet    SYNTHROID/LEVOTHROID     90 tablet    Take 1 tablet (88 mcg) by mouth daily    Hypothyroidism, unspecified type, Pure hypercholesterolemia       other medical supplies     1 each    Shingrix.  Diagnosis:  Z23.    Need for shingles vaccine       PARoxetine 10 MG tablet    PAXIL    135 tablet    Take 1.5 tablets (15 mg) by mouth every morning    Dysthymic disorder       simvastatin 20 MG tablet    ZOCOR    90 tablet    Take 1 tablet (20 mg) by mouth At Bedtime    Pure hypercholesterolemia

## 2018-08-17 NOTE — PROGRESS NOTES
"  SUBJECTIVE:   Nursing Notes:   Madison Lujan LPN  8/17/2018  9:26 AM  Signed  Chief Complaint   Patient presents with     Physical     yearly & Medication check.        Initial /64 (BP Location: Right arm, Patient Position: Sitting, Cuff Size: Adult Regular)  Pulse 60  Ht 5' 7.25\" (1.708 m)  Wt 156 lb 12.8 oz (71.1 kg)  BMI 24.38 kg/m2 Estimated body mass index is 24.38 kg/(m^2) as calculated from the following:    Height as of this encounter: 5' 7.25\" (1.708 m).    Weight as of this encounter: 156 lb 12.8 oz (71.1 kg).  Medication Reconciliation: complete    Madison Lujan LPN      Jennifer Miller is a 62 year old female who presents to clinic today for a physical.  Is been doing well over the past year.  She has lost almost 30 pounds.  She has been eating more vegetables and fruits and protein, less carbohydrates.  She is also trying to be active every day.  She has no other concerns.        HPI    I personally reviewed medications/allergies/history listed below:    Patient Active Problem List    Diagnosis Date Noted     Dysthymic disorder 02/17/2018     Priority: Medium     Other disorder of menstruation and other abnormal bleeding from female genital tract 02/17/2018     Priority: Medium     Headache 02/17/2018     Priority: Medium     Overview:   persistent       Hypothyroidism 02/17/2018     Priority: Medium     Weight gain 02/17/2018     Priority: Medium     Special screening for malignant neoplasms, colon 12/08/2017     Priority: Medium     Hyperlipidemia 10/21/2015     Priority: Medium     Past Medical History:   Diagnosis Date     Hypothyroidism     No Comments Provided     Personal history of other medical treatment (CODE)     G2, P2      Past Surgical History:   Procedure Laterality Date     COLONOSCOPY      2007,10 year f/u     COLONOSCOPY      12/08/2017,tubular adenomas follow up 2022     OTHER SURGICAL HISTORY      2007,600000,OTHER     Family History   Problem Relation Age of " "Onset     Other - See Comments Brother      obese     Family History Negative Brother      Good Health     HEART DISEASE Mother      Heart Disease     Other - See Comments Father      Breast Cancer Paternal Aunt      Cancer-breast     Social History   Substance Use Topics     Smoking status: Never Smoker     Smokeless tobacco: Never Used     Alcohol use Yes      Comment: Alcoholic Drinks/day: a glass of wine every night     Social History     Social History Narrative    .  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  Her , Coy, is a physical therapist at [employer].  2 sons.  Bill  [facility] [place], [place]     Current Outpatient Prescriptions   Medication Sig Dispense Refill     levothyroxine (SYNTHROID/LEVOTHROID) 88 MCG tablet Take 1 tablet (88 mcg) by mouth daily 90 tablet 3     other medical supplies Shingrix.  Diagnosis:  Z23. 1 each 1     PARoxetine (PAXIL) 10 MG tablet Take 1.5 tablets (15 mg) by mouth every morning 135 tablet 3     simvastatin (ZOCOR) 20 MG tablet Take 1 tablet (20 mg) by mouth At Bedtime 90 tablet 3     [DISCONTINUED] levothyroxine (SYNTHROID/LEVOTHROID) 88 MCG tablet Take 1 tablet by mouth daily       [DISCONTINUED] PARoxetine (PAXIL) 10 MG tablet TAKE 1 AND 1/2 TABLETS BY MOUTH ONCE DAILY 135 tablet 0     [DISCONTINUED] simvastatin (ZOCOR) 20 MG tablet Take 1 tablet by mouth At Bedtime       Allergies   Allergen Reactions     Penicillins Unknown     Sulfa Drugs Hives       Review of Systems   Constitutional: Negative for fatigue.   Respiratory: Negative for cough.    Psychiatric/Behavioral: Negative for mood changes.        OBJECTIVE:     /64 (BP Location: Right arm, Patient Position: Sitting, Cuff Size: Adult Regular)  Pulse 60  Ht 5' 7.25\" (1.708 m)  Wt 156 lb 12.8 oz (71.1 kg)  BMI 24.38 kg/m2  Body mass index is 24.38 kg/(m^2).  Physical Exam   Constitutional: She is oriented to person, place, and time. She appears " well-developed and well-nourished. No distress.   HENT:   Head: Normocephalic.   Right Ear: Tympanic membrane and external ear normal.   Left Ear: Tympanic membrane and external ear normal.   Nose: Nose normal.   Mouth/Throat: Oropharynx is clear and moist. No oropharyngeal exudate.   Eyes: Conjunctivae are normal. Pupils are equal, round, and reactive to light. Right eye exhibits no discharge. Left eye exhibits no discharge.   Neck: Neck supple. No tracheal deviation present. No thyromegaly present.   Cardiovascular: Normal rate, regular rhythm, S1 normal, S2 normal, normal heart sounds, intact distal pulses and normal pulses.  Exam reveals no gallop, no S3, no S4 and no friction rub.    No murmur heard.  Pulmonary/Chest: Effort normal and breath sounds normal. No respiratory distress. She has no wheezes. She has no rales.   Breast exam:  No masses palpable.  No skin changes, tethering or axillary lymphadenopathy.   Abdominal: Soft. Bowel sounds are normal. She exhibits no distension and no mass. There is no hepatosplenomegaly. There is no tenderness.   Genitourinary: No breast swelling, tenderness or discharge.   Genitourinary Comments: Pelvic/Rectal exams deferred per patient.   Musculoskeletal: Normal range of motion. She exhibits no edema.   Lymphadenopathy:     She has no cervical adenopathy.   Neurological: She is alert and oriented to person, place, and time. She has normal strength and normal reflexes. She exhibits normal muscle tone.   Skin: Skin is warm and dry. No rash noted.   Psychiatric: She has a normal mood and affect. Judgment and thought content normal. Cognition and memory are normal.         PHQ-2 Score:     PHQ-2 ( 1999 Pfizer) 8/17/2018   Q1: Little interest or pleasure in doing things 0   Q2: Feeling down, depressed or hopeless 0   PHQ-2 Score 0         I personally reviewed results withpatient as listed below:   Diagnostic Test Results:  none     ASSESSMENT/PLAN:       ICD-10-CM    1. Health  care maintenance Z00.00    2. Encounter for screening mammogram for breast cancer Z12.31 MA Screen Bilateral w/Donte   3. Need for shingles vaccine Z23 other medical supplies   4. Dysthymic disorder F34.1 PARoxetine (PAXIL) 10 MG tablet   5. Hypothyroidism, unspecified type E03.9 levothyroxine (SYNTHROID/LEVOTHROID) 88 MCG tablet     Thyrotropin GH     Thyrotropin GH   6. Pure hypercholesterolemia E78.00 simvastatin (ZOCOR) 20 MG tablet     Comprehensive Metabolic Panel     Lipid Panel     Comprehensive Metabolic Panel     Lipid Panel       1.  Mammogram as below.  Colonoscopy is up-to-date.  DEXA scan up-to-date.  Pap is up-to-date.  2.  Mammogram ordered.  3.  Prescription given to receive Shingarix at outside pharmacy.  4.  Stable.  Paxil refilled.  5.  Stable.  Recheck TSH.  Levothyroxine refilled.  6.  Stable.  Recheck conference metabolic profile and lipid profile.  Simvastatin refilled.    Eulalia Delgado MD  [facility]

## 2018-08-17 NOTE — NURSING NOTE
"Chief Complaint   Patient presents with     Physical     yearly & Medication check.        Initial /64 (BP Location: Right arm, Patient Position: Sitting, Cuff Size: Adult Regular)  Pulse 60  Ht 5' 7.25\" (1.708 m)  Wt 156 lb 12.8 oz (71.1 kg)  BMI 24.38 kg/m2 Estimated body mass index is 24.38 kg/(m^2) as calculated from the following:    Height as of this encounter: 5' 7.25\" (1.708 m).    Weight as of this encounter: 156 lb 12.8 oz (71.1 kg).  Medication Reconciliation: complete    Madison Lujan LPN    "

## 2018-08-27 ENCOUNTER — HOSPITAL ENCOUNTER (OUTPATIENT)
Dept: MAMMOGRAPHY | Facility: OTHER | Age: 63
Discharge: HOME | End: 2018-08-27
Attending: FAMILY MEDICINE | Admitting: FAMILY MEDICINE
Payer: COMMERCIAL

## 2018-08-27 DIAGNOSIS — Z12.31 ENCOUNTER FOR SCREENING MAMMOGRAM FOR BREAST CANCER: ICD-10-CM

## 2018-08-27 PROCEDURE — 77063 BREAST TOMOSYNTHESIS BI: CPT

## 2019-08-22 ENCOUNTER — TELEPHONE (OUTPATIENT)
Dept: FAMILY MEDICINE | Facility: OTHER | Age: 64
End: 2019-08-22

## 2019-08-22 DIAGNOSIS — F34.1 DYSTHYMIC DISORDER: ICD-10-CM

## 2019-08-22 DIAGNOSIS — E03.9 HYPOTHYROIDISM, UNSPECIFIED TYPE: ICD-10-CM

## 2019-08-22 DIAGNOSIS — E78.00 PURE HYPERCHOLESTEROLEMIA: ICD-10-CM

## 2019-08-22 RX ORDER — PAROXETINE 10 MG/1
15 TABLET, FILM COATED ORAL EVERY MORNING
Qty: 135 TABLET | Refills: 3 | Status: SHIPPED | OUTPATIENT
Start: 2019-08-22 | End: 2020-09-24

## 2019-08-22 RX ORDER — LEVOTHYROXINE SODIUM 88 UG/1
88 TABLET ORAL DAILY
Qty: 90 TABLET | Refills: 3 | Status: SHIPPED | OUTPATIENT
Start: 2019-08-22 | End: 2020-08-24

## 2019-08-22 RX ORDER — SIMVASTATIN 20 MG
20 TABLET ORAL AT BEDTIME
Qty: 90 TABLET | Refills: 3 | Status: SHIPPED | OUTPATIENT
Start: 2019-08-22 | End: 2020-09-16

## 2019-08-22 NOTE — TELEPHONE ENCOUNTER
Patient is looking for a work in appointment for medication refill. Patient states she will be out of medications next week.     Autumn Nieto on 8/22/2019 at 9:32 AM

## 2019-08-22 NOTE — TELEPHONE ENCOUNTER
Patient scheduled via my chart and gave her 15 minutes for a physical. She will be rescheduled but will be out of her medication before. Can she get her mediations refilled before visit? Ileana Aquino LPN ....................8/22/2019  9:41 AM

## 2019-09-12 ENCOUNTER — HOSPITAL ENCOUNTER (OUTPATIENT)
Dept: MAMMOGRAPHY | Facility: OTHER | Age: 64
Discharge: HOME | End: 2019-09-12
Attending: FAMILY MEDICINE | Admitting: FAMILY MEDICINE
Payer: COMMERCIAL

## 2019-09-12 DIAGNOSIS — Z12.39 BREAST SCREENING, UNSPECIFIED: ICD-10-CM

## 2019-09-12 PROCEDURE — 77063 BREAST TOMOSYNTHESIS BI: CPT

## 2019-10-14 ENCOUNTER — OFFICE VISIT (OUTPATIENT)
Dept: FAMILY MEDICINE | Facility: OTHER | Age: 64
End: 2019-10-14
Attending: FAMILY MEDICINE
Payer: COMMERCIAL

## 2019-10-14 VITALS
DIASTOLIC BLOOD PRESSURE: 80 MMHG | SYSTOLIC BLOOD PRESSURE: 124 MMHG | TEMPERATURE: 97.4 F | WEIGHT: 173 LBS | RESPIRATION RATE: 18 BRPM | BODY MASS INDEX: 27.15 KG/M2 | HEIGHT: 67 IN

## 2019-10-14 DIAGNOSIS — M77.11 RIGHT LATERAL EPICONDYLITIS: ICD-10-CM

## 2019-10-14 DIAGNOSIS — E03.9 HYPOTHYROIDISM, UNSPECIFIED TYPE: Primary | ICD-10-CM

## 2019-10-14 DIAGNOSIS — F34.1 DYSTHYMIC DISORDER: ICD-10-CM

## 2019-10-14 DIAGNOSIS — E78.00 PURE HYPERCHOLESTEROLEMIA: ICD-10-CM

## 2019-10-14 DIAGNOSIS — Z23 NEED FOR SHINGLES VACCINE: ICD-10-CM

## 2019-10-14 DIAGNOSIS — Z00.00 HEALTH CARE MAINTENANCE: ICD-10-CM

## 2019-10-14 LAB
ALBUMIN SERPL-MCNC: 4.2 G/DL (ref 3.5–5.7)
ALP SERPL-CCNC: 46 U/L (ref 34–104)
ALT SERPL W P-5'-P-CCNC: 14 U/L (ref 7–52)
ANION GAP SERPL CALCULATED.3IONS-SCNC: 7 MMOL/L (ref 3–14)
AST SERPL W P-5'-P-CCNC: 20 U/L (ref 13–39)
BILIRUB SERPL-MCNC: 0.6 MG/DL (ref 0.3–1)
BUN SERPL-MCNC: 13 MG/DL (ref 7–25)
CALCIUM SERPL-MCNC: 9.4 MG/DL (ref 8.6–10.3)
CHLORIDE SERPL-SCNC: 102 MMOL/L (ref 98–107)
CHOLEST SERPL-MCNC: 157 MG/DL
CO2 SERPL-SCNC: 31 MMOL/L (ref 21–31)
CREAT SERPL-MCNC: 0.74 MG/DL (ref 0.6–1.2)
GFR SERPL CREATININE-BSD FRML MDRD: 80 ML/MIN/{1.73_M2}
GLUCOSE SERPL-MCNC: 103 MG/DL (ref 70–105)
HDLC SERPL-MCNC: 68 MG/DL (ref 23–92)
LDLC SERPL CALC-MCNC: 71 MG/DL
NONHDLC SERPL-MCNC: 89 MG/DL
POTASSIUM SERPL-SCNC: 4.3 MMOL/L (ref 3.5–5.1)
PROT SERPL-MCNC: 7 G/DL (ref 6.4–8.9)
SODIUM SERPL-SCNC: 140 MMOL/L (ref 134–144)
TRIGL SERPL-MCNC: 89 MG/DL
TSH SERPL DL<=0.05 MIU/L-ACNC: 0.92 IU/ML (ref 0.34–5.6)

## 2019-10-14 PROCEDURE — 84443 ASSAY THYROID STIM HORMONE: CPT | Mod: ZL | Performed by: FAMILY MEDICINE

## 2019-10-14 PROCEDURE — 99396 PREV VISIT EST AGE 40-64: CPT | Performed by: FAMILY MEDICINE

## 2019-10-14 PROCEDURE — 80053 COMPREHEN METABOLIC PANEL: CPT | Mod: ZL | Performed by: FAMILY MEDICINE

## 2019-10-14 PROCEDURE — 36415 COLL VENOUS BLD VENIPUNCTURE: CPT | Mod: ZL | Performed by: FAMILY MEDICINE

## 2019-10-14 PROCEDURE — 80061 LIPID PANEL: CPT | Mod: ZL | Performed by: FAMILY MEDICINE

## 2019-10-14 ASSESSMENT — PAIN SCALES - GENERAL: PAINLEVEL: NO PAIN (0)

## 2019-10-14 ASSESSMENT — ENCOUNTER SYMPTOMS
FEVER: 0
SHORTNESS OF BREATH: 0
CHILLS: 0
COUGH: 0
NERVOUS/ANXIOUS: 0

## 2019-10-14 ASSESSMENT — MIFFLIN-ST. JEOR: SCORE: 1377.35

## 2019-10-14 ASSESSMENT — PATIENT HEALTH QUESTIONNAIRE - PHQ9: SUM OF ALL RESPONSES TO PHQ QUESTIONS 1-9: 0

## 2019-10-14 NOTE — PROGRESS NOTES
"  SUBJECTIVE:   Nursing Notes:   Ileana Aquino LPN  10/14/2019 10:05 AM  Sign at exiting of workspace  Chief Complaint   Patient presents with     Physical       Initial /80 (BP Location: Right arm, Patient Position: Sitting, Cuff Size: Adult Large)   Temp 97.4  F (36.3  C) (Tympanic)   Resp 18   Ht 1.702 m (5' 7\")   Wt 78.5 kg (173 lb)   BMI 27.10 kg/m    Estimated body mass index is 27.1 kg/m  as calculated from the following:    Height as of this encounter: 1.702 m (5' 7\").    Weight as of this encounter: 78.5 kg (173 lb).  Medication Reconciliation: complete    Ileana Aquino LPN    Jennifer Miller is a 64 year old female who presents to clinic today for a physical.    Right lateral elbow has been bothering her for over a month.  Over the summer and fall, she has been doing a lot of typing and computer work.  Has pain over her right lateral epicondyle.  When she is working on her computer, she typically has the keyboard and mouse slid further away from her and she is often leaning her forearms on the edge of her desk.    HPI    I personally reviewed medications/allergies/history listed below:    Patient Active Problem List    Diagnosis Date Noted     Dysthymic disorder 02/17/2018     Priority: Medium     Other disorder of menstruation and other abnormal bleeding from female genital tract 02/17/2018     Priority: Medium     Headache 02/17/2018     Priority: Medium     Overview:   persistent       Hypothyroidism 02/17/2018     Priority: Medium     Weight gain 02/17/2018     Priority: Medium     Special screening for malignant neoplasms, colon 12/08/2017     Priority: Medium     Hyperlipidemia 10/21/2015     Priority: Medium     Past Medical History:   Diagnosis Date     Hypothyroidism     No Comments Provided     Personal history of other medical treatment (CODE)     G2, P2      Past Surgical History:   Procedure Laterality Date     COLONOSCOPY      2007,10 year f/u     COLONOSCOPY  12/08/2017 " "   tubular adenomas follow up 2022     OTHER SURGICAL HISTORY      2007,600000,OTHER     Family History   Problem Relation Age of Onset     Other - See Comments Brother         obese     Family History Negative Brother         Good Health     Heart Disease Mother         Heart Disease     Other - See Comments Father      Breast Cancer Paternal Aunt         Cancer-breast     Social History     Tobacco Use     Smoking status: Never Smoker     Smokeless tobacco: Never Used   Substance Use Topics     Alcohol use: Yes     Comment: Alcoholic Drinks/day: a glass of wine every night     Social History     Patient does not qualify to have social determinant information on file (likely too young).   Social History Narrative    .  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  Her , Coy, is a physical therapist at [employer].  2 sons.  [facility] in [place], [place]     Current Outpatient Medications   Medication Sig Dispense Refill     levothyroxine (SYNTHROID/LEVOTHROID) 88 MCG tablet Take 1 tablet (88 mcg) by mouth daily 90 tablet 3     other medical supplies Shingrix.  Diagnosis:  Z23. 1 each 1     PARoxetine (PAXIL) 10 MG tablet Take 1.5 tablets (15 mg) by mouth every morning 135 tablet 3     simvastatin (ZOCOR) 20 MG tablet Take 1 tablet (20 mg) by mouth At Bedtime 90 tablet 3     Allergies   Allergen Reactions     Penicillins Unknown     Sulfa Drugs Hives       Review of Systems   Constitutional: Negative for chills and fever.   Respiratory: Negative for cough and shortness of breath.    Cardiovascular: Negative for peripheral edema.   Psychiatric/Behavioral: Negative for mood changes. The patient is not nervous/anxious.         OBJECTIVE:     /80 (BP Location: Right arm, Patient Position: Sitting, Cuff Size: Adult Large)   Temp 97.4  F (36.3  C) (Tympanic)   Resp 18   Ht 1.702 m (5' 7\")   Wt 78.5 kg (173 lb)   BMI 27.10 kg/m    Body mass index is " 27.1 kg/m .  Physical Exam  Constitutional:       General: She is not in acute distress.     Appearance: She is well-developed.   HENT:      Head: Normocephalic.      Right Ear: Tympanic membrane and external ear normal.      Left Ear: Tympanic membrane and external ear normal.      Nose: Nose normal.      Mouth/Throat:      Pharynx: No oropharyngeal exudate.   Eyes:      General:         Right eye: No discharge.         Left eye: No discharge.      Conjunctiva/sclera: Conjunctivae normal.      Pupils: Pupils are equal, round, and reactive to light.   Neck:      Musculoskeletal: Neck supple.      Thyroid: No thyromegaly.      Trachea: No tracheal deviation.   Cardiovascular:      Rate and Rhythm: Normal rate and regular rhythm.      Pulses: Normal pulses.      Heart sounds: Normal heart sounds, S1 normal and S2 normal. No murmur. No friction rub. No gallop. No S3 or S4 sounds.    Pulmonary:      Effort: Pulmonary effort is normal. No respiratory distress.      Breath sounds: Normal breath sounds. No wheezing or rales.      Comments: Breast exam:  No masses palpable bilaterally.  No skin changes, tethering or axillary lymphadenopathy bilaterally.    Abdominal:      General: Bowel sounds are normal. There is no distension.      Palpations: Abdomen is soft. There is no mass.      Tenderness: There is no tenderness.   Genitourinary:     Comments: Pelvic/Rectal exams deferred per patient.  Musculoskeletal: Normal range of motion.      Comments: Tenderness over right lateral epicondyle.   Lymphadenopathy:      Cervical: No cervical adenopathy.   Skin:     General: Skin is warm and dry.      Findings: No rash.   Neurological:      Mental Status: She is alert and oriented to person, place, and time.      Motor: No abnormal muscle tone.      Deep Tendon Reflexes: Reflexes are normal and symmetric.   Psychiatric:         Thought Content: Thought content normal.         Judgement: Judgment normal.           PHQ-9 SCORE  5/27/2014 10/14/2019   PHQ-9 Total Score 0 0       I personally reviewed results withpatient as listed below:   Diagnostic Test Results:  none     ASSESSMENT/PLAN:       ICD-10-CM    1. Hypothyroidism, unspecified type E03.9 Thyrotropin GH     Thyrotropin GH   2. Dysthymic disorder F34.1    3. Pure hypercholesterolemia E78.00 Comprehensive Metabolic Panel     Lipid Panel     Lipid Panel     Comprehensive Metabolic Panel   4. Right lateral epicondylitis M77.11    5. Health care maintenance Z00.00    6. Need for shingles vaccine Z23 other medical supplies       1.  Stable.  TSH today as above.   2.  Stable.  Continue on current dose of paxil.    3.  Stable.  Labs as above.  4.  Suggested some modifications to her desk set up when using mouse/keyboard.  She has been using ibuprofen and ice on occasion as well.  Her  is a Physical Therapist here at [employer] and she is going to have him look at her work station to assess if other changes should be made.  If still not improving, could consider an Occupational Therapy referral.  5.  Mammogram completed 9/12/19.  DEXA in 2015 was normal.  Pap Smear completed with HPV in 2017 was normal.  Colonoscopy in 2017 showed adenomas.  Follow up in 2022 was recommended.  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    6.  Prescription given to obtain Shingrix #2 at outside pharmacy.  Other vaccines are up to date.      Eulalia Delgado MD  [facility] AND [facility]    Portions of this dictation were created using the Dragon Nuance voice recognition system. Proofreading was completed but there may be errors in text.

## 2019-10-14 NOTE — LETTER
My Depression Action Plan  Name: Jennifer Miller   Date of Birth 1955  Date: 10/14/2019    My doctor: Eulalia Delgado   My clinic: [facility]  [address]  [place] [address]  [phone]          GREEN    ZONE   Good Control    What it looks like:     Things are going generally well. You have normal up s and down s. You may even feel depressed from time to time, but bad moods usually last less than a day.   What you need to do:  1. Continue to care for yourself (see self care plan)  2. Check your depression survival kit and update it as needed  3. Follow your physician s recommendations including any medication.  4. Do not stop taking medication unless you consult with your physician first.           YELLOW         ZONE Getting Worse    What it looks like:     Depression is starting to interfere with your life.     It may be hard to get out of bed; you may be starting to isolate yourself from others.    Symptoms of depression are starting to last most all day and this has happened for several days.     You may have suicidal thoughts but they are not constant.   What you need to do:     1. Call your care team, your response to treatment will improve if you keep your care team informed of your progress. Yellow periods are signs an adjustment may need to be made.     2. Continue your self-care, even if you have to fake it!    3. Talk to someone in your support network    4. Open up your depression survival kit           RED    ZONE Medical Alert - Get Help    What it looks like:     Depression is seriously interfering with your life.     You may experience these or other symptoms: You can t get out of bed most days, can t work or engage in other necessary activities, you have trouble taking care of basic hygiene, or basic responsibilities, thoughts of suicide or death that will not go away, self-injurious behavior.     What you need to do:  1. Call  your care team and request a same-day appointment. If they are not available (weekends or after hours) call your local crisis line, emergency room or 911.            Depression Self Care Plan / Survival Kit    Self-Care for Depression  Here s the deal. Your body and mind are really not as separate as most people think.  What you do and think affects how you feel and how you feel influences what you do and think. This means if you do things that people who feel good do, it will help you feel better.  Sometimes this is all it takes.  There is also a place for medication and therapy depending on how severe your depression is, so be sure to consult with your medical provider and/ or Behavioral Health Consultant if your symptoms are worsening or not improving.     In order to better manage my stress, I will:    Exercise  Get some form of exercise, every day. This will help reduce pain and release endorphins, the  feel good  chemicals in your brain. This is almost as good as taking antidepressants!  This is not the same as joining a gym and then never going! (they count on that by the way ) It can be as simple as just going for a walk or doing some gardening, anything that will get you moving.      Hygiene   Maintain good hygiene (Get out of bed in the morning, Make your bed, Brush your teeth, Take a shower, and Get dressed like you were going to work, even if you are unemployed).  If your clothes don't fit try to get ones that do.    Diet  I will strive to eat foods that are good for me, drink plenty of water, and avoid excessive sugar, caffeine, alcohol, and other mood-altering substances.  Some foods that are helpful in depression are: complex carbohydrates, B vitamins, flaxseed, fish or fish oil, fresh fruits and vegetables.    Psychotherapy  I agree to participate in Individual Therapy (if recommended).    Medication  If prescribed medications, I agree to take them.  Missing doses can result in serious side effects.   I understand that drinking alcohol, or other illicit drug use, may cause potential side effects.  I will not stop my medication abruptly without first discussing it with my provider.    Staying Connected With Others  I will stay in touch with my friends, family members, and my primary care provider/team.    Use your imagination  Be creative.  We all have a creative side; it doesn t matter if it s oil painting, sand castles, or mud pies! This will also kick up the endorphins.    Witness Beauty  (AKA stop and smell the roses) Take a look outside, even in mid-winter. Notice colors, textures. Watch the squirrels and birds.     Service to others  Be of service to others.  There is always someone else in need.  By helping others we can  get out of ourselves  and remember the really important things.  This also provides opportunities for practicing all the other parts of the program.    Humor  Laugh and be silly!  Adjust your TV habits for less news and crime-drama and more comedy.    Control your stress  Try breathing deep, massage therapy, biofeedback, and meditation. Find time to relax each day.     My support system    Clinic Contact:  Phone number:    Contact 1:  Phone number:    Contact 2:  Phone number:    Buddhist/:  Phone number:    Therapist:  Phone number:    [facility] crisis center:    Phone number:    Other community support:  Phone number:

## 2019-10-14 NOTE — NURSING NOTE
"Chief Complaint   Patient presents with     Physical       Initial /80 (BP Location: Right arm, Patient Position: Sitting, Cuff Size: Adult Large)   Temp 97.4  F (36.3  C) (Tympanic)   Resp 18   Ht 1.702 m (5' 7\")   Wt 78.5 kg (173 lb)   BMI 27.10 kg/m   Estimated body mass index is 27.1 kg/m  as calculated from the following:    Height as of this encounter: 1.702 m (5' 7\").    Weight as of this encounter: 78.5 kg (173 lb).  Medication Reconciliation: complete    Ileana Aquino LPN  "

## 2020-03-11 ENCOUNTER — HEALTH MAINTENANCE LETTER (OUTPATIENT)
Age: 65
End: 2020-03-11

## 2020-08-24 DIAGNOSIS — E03.9 HYPOTHYROIDISM, UNSPECIFIED TYPE: ICD-10-CM

## 2020-08-24 RX ORDER — LEVOTHYROXINE SODIUM 88 UG/1
TABLET ORAL
Qty: 90 TABLET | Refills: 0 | Status: SHIPPED | OUTPATIENT
Start: 2020-08-24 | End: 2020-09-24

## 2020-08-24 NOTE — TELEPHONE ENCOUNTER
"[facility] sent Rx request for the following:   levothyroxine (SYNTHROID/LEVOTHROID) 88 MCG tablet  SigTAKE 1 TABLET BY MOUTH ONCE DAILY    Last Prescription Date:   08/22/2019  Last Fill Qty/Refills:         90, R-3    Last Office Visit:              10/14/2019   Future Office visit:           None noted     Thyroid Protocol Passed -         Passed - Patient is 12 years or older        Passed - Recent (12 mo) or future (30 days) visit within the authorizing provider's specialty     Patient has had an office visit with the authorizing provider or a provider within the authorizing providers department within the previous 12 mos or has a future within next 30 days. See \"Patient Info\" tab in inbasket, or \"Choose Columns\" in Meds & Orders section of the refill encounter.              Passed - Medication is active on med list        Passed - Normal TSH on file in past 12 months     No lab results found.           Passed - No active pregnancy on record     If patient is pregnant or has had a positive pregnancy test, please check TSH.          Passed - No positive pregnancy test in past 12 months     If patient is pregnant or has had a positive pregnancy test, please check TSH.             Refill granted for 3 months supply to reach upcoming annual review need.   Prescription approved per [facility].  Yancy Clifton RN ....................  8/24/2020   3:12 PM      "

## 2020-09-14 DIAGNOSIS — E78.00 PURE HYPERCHOLESTEROLEMIA: ICD-10-CM

## 2020-09-16 RX ORDER — SIMVASTATIN 20 MG
20 TABLET ORAL AT BEDTIME
Qty: 90 TABLET | Refills: 0 | Status: SHIPPED | OUTPATIENT
Start: 2020-09-16 | End: 2020-09-24

## 2020-09-16 NOTE — TELEPHONE ENCOUNTER
Prescription approved per [facility] Refill Protocol.  LOV: 10/14/2019  Last Lipid: 10/14/2019  Patient noted to have appointment scheduled for 9/24/2020    Indiana Benavides RN on 9/16/2020 at 8:30 AM

## 2020-09-24 ENCOUNTER — OFFICE VISIT (OUTPATIENT)
Dept: FAMILY MEDICINE | Facility: OTHER | Age: 65
End: 2020-09-24
Attending: FAMILY MEDICINE
Payer: COMMERCIAL

## 2020-09-24 VITALS
HEART RATE: 54 BPM | RESPIRATION RATE: 18 BRPM | HEIGHT: 67 IN | BODY MASS INDEX: 27.82 KG/M2 | SYSTOLIC BLOOD PRESSURE: 134 MMHG | WEIGHT: 177.25 LBS | OXYGEN SATURATION: 98 % | DIASTOLIC BLOOD PRESSURE: 76 MMHG | TEMPERATURE: 97.2 F

## 2020-09-24 DIAGNOSIS — Z00.00 HEALTH CARE MAINTENANCE: Primary | ICD-10-CM

## 2020-09-24 DIAGNOSIS — F34.1 DYSTHYMIC DISORDER: ICD-10-CM

## 2020-09-24 DIAGNOSIS — Z13.0 SCREENING FOR DEFICIENCY ANEMIA: ICD-10-CM

## 2020-09-24 DIAGNOSIS — E78.00 PURE HYPERCHOLESTEROLEMIA: ICD-10-CM

## 2020-09-24 DIAGNOSIS — E03.9 HYPOTHYROIDISM, UNSPECIFIED TYPE: ICD-10-CM

## 2020-09-24 PROCEDURE — 99396 PREV VISIT EST AGE 40-64: CPT | Performed by: FAMILY MEDICINE

## 2020-09-24 RX ORDER — LEVOTHYROXINE SODIUM 88 UG/1
88 TABLET ORAL DAILY
Qty: 90 TABLET | Refills: 3 | Status: SHIPPED | OUTPATIENT
Start: 2020-09-24 | End: 2021-11-15

## 2020-09-24 RX ORDER — SIMVASTATIN 20 MG
20 TABLET ORAL AT BEDTIME
Qty: 90 TABLET | Refills: 3 | Status: SHIPPED | OUTPATIENT
Start: 2020-09-24 | End: 2021-10-18

## 2020-09-24 RX ORDER — PAROXETINE 10 MG/1
15 TABLET, FILM COATED ORAL EVERY MORNING
Qty: 135 TABLET | Refills: 3 | Status: SHIPPED | OUTPATIENT
Start: 2020-09-24 | End: 2021-09-14

## 2020-09-24 ASSESSMENT — ENCOUNTER SYMPTOMS
CHILLS: 0
FEVER: 0
COUGH: 0
SHORTNESS OF BREATH: 0
NERVOUS/ANXIOUS: 0

## 2020-09-24 ASSESSMENT — MIFFLIN-ST. JEOR: SCORE: 1396.63

## 2020-09-24 ASSESSMENT — PAIN SCALES - GENERAL: PAINLEVEL: NO PAIN (0)

## 2020-09-24 NOTE — PROGRESS NOTES
SUBJECTIVE:   Nursing Notes:   Melissa Santiago LPN  9/24/2020  3:23 PM  Sign at exiting of workspace  Patient presents to clinic for physical exam.  Medication Reconciliation: complete    Melissa Santiago LPN      Jennifer Miller is a 64 year old female who presents to clinic today for a physical.  She has been doing relatively well and has no complaints today.  She has had a lot of stress in her life in the last few months.  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  Since school started just over 2 weeks ago, there already have been several ill children, but no one yet who is tested positive for COVID-19 within the school.    HPI    I personally reviewed medications/allergies/history listed below:    Patient Active Problem List    Diagnosis Date Noted     Dysthymic disorder 02/17/2018     Priority: Medium     Other disorder of menstruation and other abnormal bleeding from female genital tract 02/17/2018     Priority: Medium     Headache 02/17/2018     Priority: Medium     Overview:   persistent       Hypothyroidism 02/17/2018     Priority: Medium     Weight gain 02/17/2018     Priority: Medium     Special screening for malignant neoplasms, colon 12/08/2017     Priority: Medium     Hyperlipidemia 10/21/2015     Priority: Medium     Past Medical History:   Diagnosis Date     Hypothyroidism     No Comments Provided     Personal history of other medical treatment (CODE)     G2, P2      Past Surgical History:   Procedure Laterality Date     COLONOSCOPY      2007,10 year f/u     COLONOSCOPY  12/08/2017    tubular adenomas follow up 2022     OTHER SURGICAL HISTORY      2007,654327,OTHER     Family History   Problem Relation Age of Onset     Other - See Comments Brother         obese     Family History Negative Brother         Good Health     Heart Disease Mother         Heart Disease     Other - See Comments Father      Breast Cancer  "Paternal Aunt         Cancer-breast     Social History     Tobacco Use     Smoking status: Never Smoker     Smokeless tobacco: Never Used   Substance Use Topics     Alcohol use: Yes     Comment: Alcoholic Drinks/day: a glass of wine every night     Social History     Social History Narrative    .  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  2 sons.  [employer] in [place], [place]     Current Outpatient Medications   Medication Sig Dispense Refill     levothyroxine (SYNTHROID/LEVOTHROID) 88 MCG tablet Take 1 tablet (88 mcg) by mouth daily 90 tablet 3     PARoxetine (PAXIL) 10 MG tablet Take 1.5 tablets (15 mg) by mouth every morning 135 tablet 3     simvastatin (ZOCOR) 20 MG tablet Take 1 tablet (20 mg) by mouth At Bedtime 90 tablet 3     Allergies   Allergen Reactions     Penicillins Unknown     Sulfa Drugs Hives       Review of Systems   Constitutional: Negative for chills and fever.   Respiratory: Negative for cough and shortness of breath.    Cardiovascular: Negative for peripheral edema.   Psychiatric/Behavioral: Negative for mood changes. The patient is not nervous/anxious.         OBJECTIVE:     /76 (BP Location: Right arm, Patient Position: Sitting, Cuff Size: Adult Regular)   Pulse 54   Temp 97.2  F (36.2  C) (Tympanic)   Resp 18   Ht 1.702 m (5' 7\")   Wt 80.4 kg (177 lb 4 oz)   LMP  (LMP Unknown)   SpO2 98%   Breastfeeding No   BMI 27.76 kg/m    Body mass index is 27.76 kg/m .  Physical Exam  Constitutional:       General: She is not in acute distress.     Appearance: She is well-developed.   HENT:      Head: Normocephalic.      Right Ear: Tympanic membrane and external ear normal.      Left Ear: Tympanic membrane and external ear normal.      Nose: Nose normal.      Mouth/Throat:      Pharynx: No oropharyngeal exudate.   Eyes:      General:         Right eye: No discharge.         " Left eye: No discharge.      Conjunctiva/sclera: Conjunctivae normal.      Pupils: Pupils are equal, round, and reactive to light.   Neck:      Musculoskeletal: Neck supple.      Thyroid: No thyromegaly.      Trachea: No tracheal deviation.   Cardiovascular:      Rate and Rhythm: Normal rate and regular rhythm.      Pulses: Normal pulses.      Heart sounds: Normal heart sounds, S1 normal and S2 normal. No murmur. No friction rub. No gallop. No S3 or S4 sounds.    Pulmonary:      Effort: Pulmonary effort is normal. No respiratory distress.      Breath sounds: Normal breath sounds. No wheezing or rales.      Comments: Breast exam:  No masses palpable bilaterally.  No skin changes, tethering or axillary lymphadenopathy bilaterally.    Abdominal:      General: Bowel sounds are normal. There is no distension.      Palpations: Abdomen is soft. There is no mass.      Tenderness: There is no abdominal tenderness.   Genitourinary:     Comments: Pelvic/Rectal exams deferred per patient.  Musculoskeletal: Normal range of motion.   Lymphadenopathy:      Cervical: No cervical adenopathy.   Skin:     General: Skin is warm and dry.      Findings: No rash.   Neurological:      Mental Status: She is alert and oriented to person, place, and time.      Motor: No abnormal muscle tone.      Deep Tendon Reflexes: Reflexes are normal and symmetric.   Psychiatric:         Thought Content: Thought content normal.         Judgment: Judgment normal.           PHQ-9 SCORE 5/27/2014 10/14/2019   PHQ-9 Total Score 0 0       PHQ-2 Score:     PHQ-2 ( 1999 Pfizer) 8/17/2018   Q1: Little interest or pleasure in doing things 0   Q2: Feeling down, depressed or hopeless 0   PHQ-2 Score 0       BRINDA-7 SCORE 6/15/2015   Total Score 1           I personally reviewed results withpatient as listed below:   Diagnostic Test Results:  none     ASSESSMENT/PLAN:       ICD-10-CM    1. Health care maintenance  Z00.00    2. Dysthymic disorder  F34.1 PARoxetine  (PAXIL) 10 MG tablet   3. Hypothyroidism, unspecified type  E03.9 levothyroxine (SYNTHROID/LEVOTHROID) 88 MCG tablet     TSH Reflex GH   4. Pure hypercholesterolemia  E78.00 simvastatin (ZOCOR) 20 MG tablet     Comprehensive metabolic panel     Lipid Profile   5. Screening for deficiency anemia  Z13.0 CBC and Differential       1.  Mammogram was completed on 9/12/2019.  She has received notice that this is due and will make an appointment on her own.  Last DEXA was 6/19/2015 and was normal.  Pap/HPV were last completed on 8/8/2017 and were normal.  Colonoscopy last completed 12/8/2017.  Tubular adenomas were noted and follow-up was recommended in 5 years.  She plans to receive a flu shot at school.  2.  Paxil refilled today as above.  3.  Stable.  Levothyroxine refilled.  She will return to clinic another day for a lab visit.  TSH order placed.  4.  Simvastatin refilled as above.  Comprehensive metabolic profile and lipid profile ordered.  5.  CBC ordered as above.    Eulalia Delgado MD  [facility] AND [facility]    Portions of this dictation were created using the Dragon Nuance voice recognition system. Proofreading was completed but there may be errors in text.

## 2020-11-19 DIAGNOSIS — E03.9 HYPOTHYROIDISM, UNSPECIFIED TYPE: ICD-10-CM

## 2020-11-19 DIAGNOSIS — E78.00 PURE HYPERCHOLESTEROLEMIA: ICD-10-CM

## 2020-11-19 DIAGNOSIS — Z13.0 SCREENING FOR DEFICIENCY ANEMIA: ICD-10-CM

## 2020-11-19 LAB
ALBUMIN SERPL-MCNC: 4.3 G/DL (ref 3.5–5.7)
ALP SERPL-CCNC: 71 U/L (ref 34–104)
ALT SERPL W P-5'-P-CCNC: 15 U/L (ref 7–52)
ANION GAP SERPL CALCULATED.3IONS-SCNC: 7 MMOL/L (ref 3–14)
AST SERPL W P-5'-P-CCNC: 18 U/L (ref 13–39)
BASOPHILS # BLD AUTO: 0.1 10E9/L (ref 0–0.2)
BASOPHILS NFR BLD AUTO: 0.8 %
BILIRUB SERPL-MCNC: 0.6 MG/DL (ref 0.3–1)
BUN SERPL-MCNC: 16 MG/DL (ref 7–25)
CALCIUM SERPL-MCNC: 9.3 MG/DL (ref 8.6–10.3)
CHLORIDE SERPL-SCNC: 103 MMOL/L (ref 98–107)
CHOLEST SERPL-MCNC: 166 MG/DL
CO2 SERPL-SCNC: 29 MMOL/L (ref 21–31)
CREAT SERPL-MCNC: 0.78 MG/DL (ref 0.6–1.2)
DIFFERENTIAL METHOD BLD: NORMAL
EOSINOPHIL # BLD AUTO: 0.2 10E9/L (ref 0–0.7)
EOSINOPHIL NFR BLD AUTO: 2 %
ERYTHROCYTE [DISTWIDTH] IN BLOOD BY AUTOMATED COUNT: 12.2 % (ref 10–15)
GFR SERPL CREATININE-BSD FRML MDRD: 75 ML/MIN/{1.73_M2}
GLUCOSE SERPL-MCNC: 104 MG/DL (ref 70–105)
HCT VFR BLD AUTO: 41.2 % (ref 35–47)
HDLC SERPL-MCNC: 54 MG/DL (ref 23–92)
HGB BLD-MCNC: 13.1 G/DL (ref 11.7–15.7)
IMM GRANULOCYTES # BLD: 0 10E9/L (ref 0–0.4)
IMM GRANULOCYTES NFR BLD: 0.4 %
LDLC SERPL CALC-MCNC: 92 MG/DL
LYMPHOCYTES # BLD AUTO: 2 10E9/L (ref 0.8–5.3)
LYMPHOCYTES NFR BLD AUTO: 27.2 %
MCH RBC QN AUTO: 27.6 PG (ref 26.5–33)
MCHC RBC AUTO-ENTMCNC: 31.8 G/DL (ref 31.5–36.5)
MCV RBC AUTO: 87 FL (ref 78–100)
MONOCYTES # BLD AUTO: 0.6 10E9/L (ref 0–1.3)
MONOCYTES NFR BLD AUTO: 7.8 %
NEUTROPHILS # BLD AUTO: 4.6 10E9/L (ref 1.6–8.3)
NEUTROPHILS NFR BLD AUTO: 61.8 %
NONHDLC SERPL-MCNC: 112 MG/DL
PLATELET # BLD AUTO: 330 10E9/L (ref 150–450)
POTASSIUM SERPL-SCNC: 4.2 MMOL/L (ref 3.5–5.1)
PROT SERPL-MCNC: 7.4 G/DL (ref 6.4–8.9)
RBC # BLD AUTO: 4.75 10E12/L (ref 3.8–5.2)
SODIUM SERPL-SCNC: 139 MMOL/L (ref 134–144)
TRIGL SERPL-MCNC: 100 MG/DL
TSH SERPL DL<=0.05 MIU/L-ACNC: 1.62 IU/ML (ref 0.34–5.6)
WBC # BLD AUTO: 7.5 10E9/L (ref 4–11)

## 2020-11-19 PROCEDURE — 84443 ASSAY THYROID STIM HORMONE: CPT | Mod: ZL | Performed by: FAMILY MEDICINE

## 2020-11-19 PROCEDURE — 85025 COMPLETE CBC W/AUTO DIFF WBC: CPT | Mod: ZL | Performed by: FAMILY MEDICINE

## 2020-11-19 PROCEDURE — 80061 LIPID PANEL: CPT | Mod: ZL | Performed by: FAMILY MEDICINE

## 2020-11-19 PROCEDURE — 80053 COMPREHEN METABOLIC PANEL: CPT | Mod: ZL | Performed by: FAMILY MEDICINE

## 2020-11-19 PROCEDURE — 36415 COLL VENOUS BLD VENIPUNCTURE: CPT | Mod: ZL | Performed by: FAMILY MEDICINE

## 2020-12-16 ENCOUNTER — HOSPITAL ENCOUNTER (OUTPATIENT)
Dept: MAMMOGRAPHY | Facility: OTHER | Age: 65
Discharge: HOME | End: 2020-12-16
Attending: FAMILY MEDICINE | Admitting: FAMILY MEDICINE
Payer: COMMERCIAL

## 2020-12-16 DIAGNOSIS — Z12.31 VISIT FOR SCREENING MAMMOGRAM: ICD-10-CM

## 2020-12-16 PROCEDURE — 77067 SCR MAMMO BI INCL CAD: CPT

## 2021-01-04 ENCOUNTER — TELEPHONE (OUTPATIENT)
Dept: FAMILY MEDICINE | Facility: OTHER | Age: 66
End: 2021-01-04

## 2021-01-04 DIAGNOSIS — R19.7 DIARRHEA, UNSPECIFIED TYPE: Primary | ICD-10-CM

## 2021-01-04 NOTE — TELEPHONE ENCOUNTER
I did put orders through for stool studies to look for infectious causes of diarrhea (such as C. Diff, giardia, salmonella, toxic types of E. Coli, etc).  We had checked her TSH just a little over a month ago and it was in good range at that time.  If her stool studies are normal and she continues to have diarrhea, I should see her to look into other possible causes.  Diarrhea can be a manifestation of covid as well.  If she would like to be tested for this, I can also order that for her.  Eulalia Delgado MD

## 2021-01-04 NOTE — TELEPHONE ENCOUNTER
Patient experiencing liquid diarrhea for past 3 weeks.  Imodium helps for a day, Pepto does not work.  She is doing the BRAT diet and drinking a lot of water.  She denies any fever, body aches, nausea or abdominal pain.    Melissa Santiago LPN............1/4/2021 3:26 PM

## 2021-01-04 NOTE — TELEPHONE ENCOUNTER
Patient notified of response below per Eulalia Delgado MD and verbally understood.  She will stop by the clinic to  stool study package.    Melissa Santiago LPN............1/4/2021 4:52 PM

## 2021-01-05 DIAGNOSIS — R19.7 DIARRHEA, UNSPECIFIED TYPE: ICD-10-CM

## 2021-01-05 LAB
C DIFF TOX B STL QL: NEGATIVE
LACTOFERRIN STL QL IA: POSITIVE
SPECIMEN SOURCE: NORMAL

## 2021-01-05 PROCEDURE — 87899 AGENT NOS ASSAY W/OPTIC: CPT | Mod: ZL | Performed by: FAMILY MEDICINE

## 2021-01-05 PROCEDURE — 87493 C DIFF AMPLIFIED PROBE: CPT | Mod: ZL | Performed by: FAMILY MEDICINE

## 2021-01-05 PROCEDURE — 87045 FECES CULTURE AEROBIC BACT: CPT | Mod: ZL | Performed by: FAMILY MEDICINE

## 2021-01-05 PROCEDURE — 87209 SMEAR COMPLEX STAIN: CPT | Mod: ZL | Performed by: FAMILY MEDICINE

## 2021-01-05 PROCEDURE — 87046 STOOL CULTR AEROBIC BACT EA: CPT | Mod: ZL | Performed by: FAMILY MEDICINE

## 2021-01-05 PROCEDURE — 87177 OVA AND PARASITES SMEARS: CPT | Mod: ZL | Performed by: FAMILY MEDICINE

## 2021-01-05 PROCEDURE — 83630 LACTOFERRIN FECAL (QUAL): CPT | Mod: ZL | Performed by: FAMILY MEDICINE

## 2021-01-06 LAB
O+P STL MICRO: NORMAL
SPECIMEN SOURCE: NORMAL

## 2021-01-07 ENCOUNTER — TELEPHONE (OUTPATIENT)
Dept: FAMILY MEDICINE | Facility: OTHER | Age: 66
End: 2021-01-07

## 2021-01-07 LAB
BACTERIA SPEC CULT: NORMAL
E COLI SXT1+2 STL IA: NORMAL
SPECIMEN SOURCE: NORMAL

## 2021-01-08 ENCOUNTER — TELEPHONE (OUTPATIENT)
Dept: FAMILY MEDICINE | Facility: OTHER | Age: 66
End: 2021-01-08

## 2021-01-08 ENCOUNTER — TELEPHONE (OUTPATIENT)
Dept: SURGERY | Facility: OTHER | Age: 66
End: 2021-01-08

## 2021-01-08 DIAGNOSIS — R19.7 DIARRHEA, UNSPECIFIED TYPE: Primary | ICD-10-CM

## 2021-01-08 RX ORDER — CHOLESTYRAMINE 4 G/9G
4 POWDER, FOR SUSPENSION ORAL
Qty: 378 G | Refills: 11 | Status: SHIPPED | OUTPATIENT
Start: 2021-01-08 | End: 2021-11-15

## 2021-01-08 NOTE — TELEPHONE ENCOUNTER
Patient ova & parasite test and c. Diff test were both negative. The final stool culture was negative as well. Her lactoferrin test was positive. Pt was notified of all test results and [facility] recommendation to have another colonoscopy since her diarrhea has continued. Patient stated she would call back later today to schedule another colonoscopy.    Patient is desperate for some relief of symptoms and was wondering if there is anything she can be put on, imodium and Pepcid are still having no effect.     Katie Seo LPN on 1/8/2021 at 8:00 AM

## 2021-01-08 NOTE — TELEPHONE ENCOUNTER
After proper verification writer spoke with patient regarding message below. Patient verbalized understanding and had no other questions at this time.  Diana Rene LPN on 1/8/2021 at 9:42 AM

## 2021-01-08 NOTE — TELEPHONE ENCOUNTER
Colonoscopy ordered.  I also am sending a prescription for cholestyramine to her pharmacy.  It is a medication that comes in a powder that binds up stool and may help with her diarrhea as well.  She would take it three times a day with meals.  If she hasn't been tested for covid recently, she may also want to consider doing that as diarrhea can be a manifestation of covid in some people.  If she is interested in being tested, please let me know.  Eulalia Delgado MD

## 2021-01-08 NOTE — TELEPHONE ENCOUNTER
Patient referred by Dr. Delgado for a diagnostic colonoscopy ,   Diagnosis is diarrhea.   Please advise.  Thank you.Junie Diaz on 1/8/2021 at 1:05 PM

## 2021-01-11 DIAGNOSIS — Z86.0101 H/O ADENOMATOUS POLYP OF COLON: ICD-10-CM

## 2021-01-11 DIAGNOSIS — R19.7 DIARRHEA, UNSPECIFIED TYPE: Primary | ICD-10-CM

## 2021-01-11 PROBLEM — Z12.11 SPECIAL SCREENING FOR MALIGNANT NEOPLASMS, COLON: Status: RESOLVED | Noted: 2017-12-08 | Resolved: 2021-01-11

## 2021-01-11 RX ORDER — BISACODYL 5 MG
TABLET, DELAYED RELEASE (ENTERIC COATED) ORAL
Qty: 2 TABLET | Refills: 0 | Status: ON HOLD | OUTPATIENT
Start: 2021-01-11 | End: 2021-01-25

## 2021-01-11 RX ORDER — POLYETHYLENE GLYCOL 3350, SODIUM CHLORIDE, SODIUM BICARBONATE, POTASSIUM CHLORIDE 420; 11.2; 5.72; 1.48 G/4L; G/4L; G/4L; G/4L
4000 POWDER, FOR SOLUTION ORAL ONCE
Qty: 4000 ML | Refills: 0 | Status: SHIPPED | OUTPATIENT
Start: 2021-01-11 | End: 2021-01-11

## 2021-01-11 NOTE — TELEPHONE ENCOUNTER
Screening Questions for the Scheduling of Screening Colonoscopies   (If Colonoscopy is diagnostic, Provider should review the chart before scheduling.)  Are you younger than 50 or older than 80?   NO   Do you take aspirin or fish oil?  YES - ASPIRIN   (if yes, tell patient to stop 1 week prior to Colonoscopy)  Do you take warfarin (Coumadin), clopidogrel (Plavix), apixaban (Eliquis), dabigatram (Pradaxa), rivaroxaban (Xarelto) or any blood thinner? NO   Do you use oxygen at home?  NO   Do you have kidney disease? NO   Are you on dialysis? NO  Have you had a stroke or heart attack in the last year? NO   Have you had a stent in your heart or any blood vessel in the last year? NO   Have you had a transplant of any organ? NO   Have you had a colonoscopy or upper endoscopy (EGD) before? YES          When?   2 YRS AGO   Date of scheduled Colonoscopy. 01/25/2021  Provider MEGAN   Pharmacy [facility]

## 2021-01-12 NOTE — TELEPHONE ENCOUNTER
Left message to call back, has 2 result notes  ...................Christin Lester LPN  1/7/2021   4:15 PM         
Patient calling back missed call from Leonid Yu on 1/7/2021 at 3:22 PM   
Returned your call.  
See other phone note.   Agnes Burdick LPN.................. 1/12/2021 4:34 PM   
See result note.    Laith Kirkpatrick LPN .......  1/7/2021  3:00 PM      
1.79

## 2021-01-21 ENCOUNTER — ALLIED HEALTH/NURSE VISIT (OUTPATIENT)
Dept: FAMILY MEDICINE | Facility: OTHER | Age: 66
End: 2021-01-21
Attending: SURGERY
Payer: COMMERCIAL

## 2021-01-21 DIAGNOSIS — Z86.0101 H/O ADENOMATOUS POLYP OF COLON: ICD-10-CM

## 2021-01-21 DIAGNOSIS — R19.7 DIARRHEA, UNSPECIFIED TYPE: ICD-10-CM

## 2021-01-21 LAB
SARS-COV-2 RNA RESP QL NAA+PROBE: NORMAL
SPECIMEN SOURCE: NORMAL

## 2021-01-21 PROCEDURE — U0003 INFECTIOUS AGENT DETECTION BY NUCLEIC ACID (DNA OR RNA); SEVERE ACUTE RESPIRATORY SYNDROME CORONAVIRUS 2 (SARS-COV-2) (CORONAVIRUS DISEASE [COVID-19]), AMPLIFIED PROBE TECHNIQUE, MAKING USE OF HIGH THROUGHPUT TECHNOLOGIES AS DESCRIBED BY CMS-2020-01-R: HCPCS | Mod: ZL | Performed by: SURGERY

## 2021-01-21 PROCEDURE — U0005 INFEC AGEN DETEC AMPLI PROBE: HCPCS | Mod: ZL | Performed by: SURGERY

## 2021-01-21 PROCEDURE — C9803 HOPD COVID-19 SPEC COLLECT: HCPCS

## 2021-01-21 NOTE — PROGRESS NOTES
Patient swabbed for COVID-19 testing.  Josi Lancaster LPN on 1/21/2021 at 8:10 AM    Procedure on 1-25-21 at [facility].

## 2021-01-22 LAB
LABORATORY COMMENT REPORT: NORMAL
SARS-COV-2 RNA RESP QL NAA+PROBE: NEGATIVE
SPECIMEN SOURCE: NORMAL

## 2021-01-25 ENCOUNTER — HOSPITAL ENCOUNTER (OUTPATIENT)
Facility: OTHER | Age: 66
Discharge: HOME | End: 2021-01-25
Attending: SURGERY | Admitting: SURGERY
Payer: COMMERCIAL

## 2021-01-25 ENCOUNTER — ANESTHESIA EVENT (OUTPATIENT)
Dept: SURGERY | Facility: OTHER | Age: 66
End: 2021-01-25
Payer: COMMERCIAL

## 2021-01-25 ENCOUNTER — ANESTHESIA (OUTPATIENT)
Dept: SURGERY | Facility: OTHER | Age: 66
End: 2021-01-25
Payer: COMMERCIAL

## 2021-01-25 VITALS
DIASTOLIC BLOOD PRESSURE: 53 MMHG | RESPIRATION RATE: 16 BRPM | OXYGEN SATURATION: 99 % | WEIGHT: 177 LBS | SYSTOLIC BLOOD PRESSURE: 103 MMHG | HEART RATE: 56 BPM | TEMPERATURE: 97.4 F | BODY MASS INDEX: 27.72 KG/M2

## 2021-01-25 PROBLEM — K57.30 SIGMOID DIVERTICULOSIS: Status: ACTIVE | Noted: 2021-01-25

## 2021-01-25 PROCEDURE — 250N000011 HC RX IP 250 OP 636: Performed by: NURSE ANESTHETIST, CERTIFIED REGISTERED

## 2021-01-25 PROCEDURE — 999N000010 HC STATISTIC ANES STAT CODE-CRNA PER MINUTE: Performed by: SURGERY

## 2021-01-25 PROCEDURE — 45380 COLONOSCOPY AND BIOPSY: CPT | Performed by: SURGERY

## 2021-01-25 PROCEDURE — 250N000009 HC RX 250: Performed by: NURSE ANESTHETIST, CERTIFIED REGISTERED

## 2021-01-25 PROCEDURE — 45380 COLONOSCOPY AND BIOPSY: CPT | Performed by: NURSE ANESTHETIST, CERTIFIED REGISTERED

## 2021-01-25 PROCEDURE — 258N000003 HC RX IP 258 OP 636: Performed by: SURGERY

## 2021-01-25 PROCEDURE — 88305 TISSUE EXAM BY PATHOLOGIST: CPT

## 2021-01-25 RX ORDER — FLUMAZENIL 0.1 MG/ML
0.2 INJECTION, SOLUTION INTRAVENOUS
Status: DISCONTINUED | OUTPATIENT
Start: 2021-01-25 | End: 2021-01-25 | Stop reason: HOSPADM

## 2021-01-25 RX ORDER — PROPOFOL 10 MG/ML
INJECTION, EMULSION INTRAVENOUS PRN
Status: DISCONTINUED | OUTPATIENT
Start: 2021-01-25 | End: 2021-01-25

## 2021-01-25 RX ORDER — LIDOCAINE 40 MG/G
CREAM TOPICAL
Status: DISCONTINUED | OUTPATIENT
Start: 2021-01-25 | End: 2021-01-25 | Stop reason: HOSPADM

## 2021-01-25 RX ORDER — LIDOCAINE HYDROCHLORIDE 20 MG/ML
INJECTION, SOLUTION INFILTRATION; PERINEURAL PRN
Status: DISCONTINUED | OUTPATIENT
Start: 2021-01-25 | End: 2021-01-25

## 2021-01-25 RX ORDER — PROPOFOL 10 MG/ML
INJECTION, EMULSION INTRAVENOUS CONTINUOUS PRN
Status: DISCONTINUED | OUTPATIENT
Start: 2021-01-25 | End: 2021-01-25

## 2021-01-25 RX ORDER — ONDANSETRON 2 MG/ML
4 INJECTION INTRAMUSCULAR; INTRAVENOUS
Status: DISCONTINUED | OUTPATIENT
Start: 2021-01-25 | End: 2021-01-25 | Stop reason: HOSPADM

## 2021-01-25 RX ORDER — NALOXONE HYDROCHLORIDE 0.4 MG/ML
0.2 INJECTION, SOLUTION INTRAMUSCULAR; INTRAVENOUS; SUBCUTANEOUS
Status: DISCONTINUED | OUTPATIENT
Start: 2021-01-25 | End: 2021-01-25 | Stop reason: HOSPADM

## 2021-01-25 RX ORDER — NALOXONE HYDROCHLORIDE 0.4 MG/ML
0.4 INJECTION, SOLUTION INTRAMUSCULAR; INTRAVENOUS; SUBCUTANEOUS
Status: DISCONTINUED | OUTPATIENT
Start: 2021-01-25 | End: 2021-01-25 | Stop reason: HOSPADM

## 2021-01-25 RX ORDER — SODIUM CHLORIDE, SODIUM LACTATE, POTASSIUM CHLORIDE, CALCIUM CHLORIDE 600; 310; 30; 20 MG/100ML; MG/100ML; MG/100ML; MG/100ML
INJECTION, SOLUTION INTRAVENOUS CONTINUOUS
Status: DISCONTINUED | OUTPATIENT
Start: 2021-01-25 | End: 2021-01-25 | Stop reason: HOSPADM

## 2021-01-25 RX ADMIN — SODIUM CHLORIDE, POTASSIUM CHLORIDE, SODIUM LACTATE AND CALCIUM CHLORIDE: 600; 310; 30; 20 INJECTION, SOLUTION INTRAVENOUS at 07:58

## 2021-01-25 RX ADMIN — PROPOFOL 120 MCG/KG/MIN: 10 INJECTION, EMULSION INTRAVENOUS at 08:35

## 2021-01-25 RX ADMIN — LIDOCAINE HYDROCHLORIDE 40 MG: 20 INJECTION, SOLUTION INFILTRATION; PERINEURAL at 08:35

## 2021-01-25 RX ADMIN — PROPOFOL 40 MG: 10 INJECTION, EMULSION INTRAVENOUS at 08:35

## 2021-01-25 NOTE — H&P
History and Physical    CHIEF COMPLAINT / REASON FOR PROCEDURE:  Diarrhea and h/o polyps    PERTINENT HISTORY   Patient is a 65 year old female who presents today for colonoscopy for diarrhea and h/o polyps.   Last colonoscopy 2017.  Patient has no other complaints.    Past Medical History:   Diagnosis Date     Hypothyroidism     No Comments Provided     Personal history of other medical treatment (CODE)     G2, P2     Past Surgical History:   Procedure Laterality Date     COLONOSCOPY      2007,10 year f/u     COLONOSCOPY  12/08/2017    tubular adenomas follow up 2022     OTHER SURGICAL HISTORY      2007,[id number],OTHER       Bleeding tendencies:  No    ALLERGIES/SENSITIVITIES:   Allergies   Allergen Reactions     Penicillins Unknown     Sulfa Drugs Hives        CURRENT MEDICATIONS:    Prior to Admission medications    Medication Sig Start Date End Date Taking? Authorizing Provider   cholestyramine (QUESTRAN) 4 GM/DOSE powder Take 4 g by mouth 3 times daily (with meals) 1/8/21  Yes Eulalia Delgado MD   levothyroxine (SYNTHROID/LEVOTHROID) 88 MCG tablet Take 1 tablet (88 mcg) by mouth daily 9/24/20  Yes Eulalia Delgado MD   PARoxetine (PAXIL) 10 MG tablet Take 1.5 tablets (15 mg) by mouth every morning 9/24/20  Yes Eulalia Delgado MD   simvastatin (ZOCOR) 20 MG tablet Take 1 tablet (20 mg) by mouth At Bedtime 9/24/20  Yes Eulalia Delgado MD       Physical Exam:  /69   Pulse 64   Temp 97.3  F (36.3  C) (Tympanic)   Resp 16   Wt 80.3 kg (177 lb)   SpO2 98%   BMI 27.72 kg/m    EXAM:  Chest/Respiratory Exam: Normal - Clear to auscultation without rales, rhonchi, or wheezing.  Cardiovascular Exam: normal, regular rate and rhythm        PLAN: COLONOSCOPY .  Patient understands risks of bleeding, perforation, potential inability to reach cecum, aspiration and wishes to proceed.

## 2021-01-25 NOTE — ANESTHESIA PREPROCEDURE EVALUATION
Anesthesia Pre-Procedure Evaluation    Patient: Jennifer Miller   MRN: [id number] : 1955        Preoperative Diagnosis: Diarrhea [R19.7]  History of colon polyps [Z86.010]   Procedure : Procedure(s):  COLONOSCOPY     Past Medical History:   Diagnosis Date     Hypothyroidism     No Comments Provided     Personal history of other medical treatment (CODE)     G2, P2      Past Surgical History:   Procedure Laterality Date     COLONOSCOPY      ,10 year f/u     COLONOSCOPY  2017    tubular adenomas follow up      OTHER SURGICAL HISTORY      ,,OTHER      Allergies   Allergen Reactions     Penicillins Unknown     Sulfa Drugs Hives      Social History     Tobacco Use     Smoking status: Never Smoker     Smokeless tobacco: Never Used   Substance Use Topics     Alcohol use: Yes     Comment: Alcoholic Drinks/day: a glass of wine every night      Wt Readings from Last 1 Encounters:   21 80.3 kg (177 lb)        Anesthesia Evaluation   Pt has had prior anesthetic.     No history of anesthetic complications       ROS/MED HX  ENT/Pulmonary:  - neg pulmonary ROS     Neurologic:     (+) migraines,     Cardiovascular:     (+) Dyslipidemia -----    METS/Exercise Tolerance: >4 METS    Hematologic:  - neg hematologic  ROS     Musculoskeletal:  - neg musculoskeletal ROS     GI/Hepatic:     (+) bowel prep,     Renal/Genitourinary:  - neg Renal ROS     Endo:     (+) thyroid problem, hypothyroidism,     Psychiatric/Substance Use:  - neg psychiatric ROS     Infectious Disease:  - neg infectious disease ROS     Malignancy:  - neg malignancy ROS     Other:               OUTSIDE LABS:  CBC:   Lab Results   Component Value Date    WBC 7.5 2020    HGB 13.1 2020    HGB 13.3 06/10/2016    HCT 41.2 2020    HCT 40.5 06/10/2016     2020     06/10/2016     BMP:   Lab Results   Component Value Date     2020     10/14/2019    POTASSIUM 4.2 2020     POTASSIUM 4.3 10/14/2019    CHLORIDE 103 11/19/2020    CHLORIDE 102 10/14/2019    CO2 29 11/19/2020    CO2 31 10/14/2019    BUN 16 11/19/2020    BUN 13 10/14/2019    CR 0.78 11/19/2020    CR 0.74 10/14/2019     11/19/2020     10/14/2019     COAGS: No results found for: PTT, INR, FIBR  POC: No results found for: BGM, HCG, HCGS  HEPATIC:   Lab Results   Component Value Date    ALBUMIN 4.3 11/19/2020    PROTTOTAL 7.4 11/19/2020    ALT 15 11/19/2020    AST 18 11/19/2020    ALKPHOS 71 11/19/2020    BILITOTAL 0.6 11/19/2020     OTHER:   Lab Results   Component Value Date    FLORIN 9.3 11/19/2020       Anesthesia Plan     History & Physical Review      ASA Status:  2. NPO Status:  NPO Appropriate. .  Plan for MAC            Consents  Anesthesia Plan(s) and associated risks, benefits, and realistic alternatives discussed.    Questions answered and patient/representative(s) expressed understanding.    Discussed with:  Patient.       Extended Intubation/Ventilatory Support Discussed No No     Use of blood products discussed: No.          Postoperative Care            LITO Benavides CRNA

## 2021-01-25 NOTE — OP NOTE
PROCEDURE NOTE    DATE OF SERVICE: 1/25/2021    SURGEON: Mateusz Claire MD    PRE-OP DIAGNOSIS:    Diarrhea  History of Polyps        POST-OP DIAGNOSIS:  Same  Sigmoid Diverticulosis      PROCEDURE:   Colonoscopy with random biopsies    ANESTHESIA:  EL Cope CRNA    INDICATION FOR THE PROCEDURE: The patient is a 65 year old female with h/o polyps and diarrhea . The patient has no other complaints  . After explaining the risks to include bleeding, perforation, potential inability toreach the cecum, the patient wished to proceed.    PROCEDURE:After adequate sedation, the patient was in the left lateral decubitus position.  Rectal exam was performed.  There was normal tone and no palpable masses .  The colonoscope was introduced into the rectum and advanced to the cecum with Moderate difficulty.  The patient's prep was excellent.  The terminal cecum was reached.  The TI,  cecum, ascending, transverse, descending and sigmoid colon was with sigmoid diverticulosis. TI normal. Remaining colon had some minimal granularity but nothing remarkable. Random biopsies taken from TI, right, left and rectum .  The scope was retroflexed in the rectum.  The rectum was unremarkable  .  The scope was straightened and removed.  The patient tolerated the procedure well.     ESTIMATED BLOOD LOSS: none    COMPLICATIONS:  None    TISSUE REMOVED:  Yes    RECOMMEND:      Follow-up pending pathology  Fiber  Given literature on diverticulosis    Mateusz Claire MD FACS

## 2021-01-25 NOTE — ANESTHESIA CARE TRANSFER NOTE
Patient: Jennifer Miller    Procedure(s):  COLONOSCOPY, WITH BIOPSY    Diagnosis: Diarrhea [R19.7]  History of colon polyps [Z86.010]  Diagnosis Additional Information: No value filed.    Anesthesia Type:   MAC     Note:      Level of Consciousness: awake      Independent Airway: airway patency satisfactory and stable        Patient transferred to: Phase II    Handoff Report: Identifed the Patient, Identified the Reponsible Provider, Reviewed the pertinent medical history, Discussed the surgical course, Reviewed Intra-OP anesthesia mangement and issues during anesthesia, Set expectations for post-procedure period and Allowed opportunity for questions and acknowledgement of understanding      Vitals: (Last set prior to Anesthesia Care Transfer)  CRNA VITALS  1/25/2021 0835 - 1/25/2021 0907      1/25/2021             Resp Rate (set):  10        Electronically Signed By: LITO CAMARENA CRNA  January 25, 2021  9:07 AM

## 2021-01-28 DIAGNOSIS — K52.831 COLLAGENOUS COLITIS: Primary | ICD-10-CM

## 2021-01-28 RX ORDER — BUDESONIDE 3 MG/1
9 CAPSULE, COATED PELLETS ORAL EVERY MORNING
Qty: 180 CAPSULE | Refills: 1 | Status: SHIPPED | OUTPATIENT
Start: 2021-01-28 | End: 2021-11-15

## 2021-03-09 ENCOUNTER — TELEPHONE (OUTPATIENT)
Dept: FAMILY MEDICINE | Facility: OTHER | Age: 66
End: 2021-03-09

## 2021-03-09 NOTE — TELEPHONE ENCOUNTER
If she is still taking 9 mg (3 of the 3 mg capsules) daily, would recommend that she decrease to 6 mg daily x 1 week, then 3 mg daily x 1 week, then stop.  If she needs more of the capsules to allow her to do this, let me know and I can send in a prescription.  Eulalia Delgado MD

## 2021-03-09 NOTE — TELEPHONE ENCOUNTER
Patient inquiring if she needs to taper off of the Budesonide 3mg medication.  She still has 20 days left of treatment for Colitis with this med if she continues to take 3 capsules daily.    Melissa Santiago LPN............3/9/2021 4:40 PM     0

## 2021-03-09 NOTE — TELEPHONE ENCOUNTER
Patient notified of response per Eulalia Delgado MD and verbally understood.  She does have enough of the medication to start the tapering.  Patient will call with any other questions or concerns.    Melissa Santiago LPN............3/9/2021 5:07 PM

## 2021-03-09 NOTE — TELEPHONE ENCOUNTER
Please call the patient back today after 4:30 or tomorrow AM.  She wants to know what is the next step after her medication is done.      Kaye Kirkpatrick on 3/9/2021 at 3:26 PM

## 2021-05-17 ENCOUNTER — TELEPHONE (OUTPATIENT)
Dept: FAMILY MEDICINE | Facility: OTHER | Age: 66
End: 2021-05-17

## 2021-05-17 ENCOUNTER — MYC MEDICAL ADVICE (OUTPATIENT)
Dept: FAMILY MEDICINE | Facility: OTHER | Age: 66
End: 2021-05-17

## 2021-05-17 DIAGNOSIS — L23.7 CONTACT DERMATITIS DUE TO POISON IVY: Primary | ICD-10-CM

## 2021-05-17 RX ORDER — PREDNISONE 20 MG/1
20 TABLET ORAL 2 TIMES DAILY
Qty: 10 TABLET | Refills: 0 | Status: SHIPPED | OUTPATIENT
Start: 2021-05-17 | End: 2021-05-22

## 2021-05-17 RX ORDER — TRIAMCINOLONE ACETONIDE 1 MG/G
CREAM TOPICAL
Qty: 45 G | Refills: 1 | Status: SHIPPED | OUTPATIENT
Start: 2021-05-17 | End: 2021-11-15

## 2021-05-17 NOTE — TELEPHONE ENCOUNTER
Please advise if the doctor will send a prescription in. Ileana Aquino LPN ....................5/17/2021  8:15 AM

## 2021-05-17 NOTE — TELEPHONE ENCOUNTER
Patient has had poison ivy for about a week. Can [facility] prescribe something that may help clear this up faster.    Gema Yu on 5/17/2021 at 8:08 AM

## 2021-05-17 NOTE — TELEPHONE ENCOUNTER
I sent in a prescription for prednisone 20 mg twice daily x 5 days and also a prescription for topical triamcinolone 0.1% cream that she may use twice daily as needed as well.  Prescriptions sent to [facility] pharmacy.  Eulalia Delgado MD

## 2021-09-12 DIAGNOSIS — F34.1 DYSTHYMIC DISORDER: ICD-10-CM

## 2021-09-12 NOTE — LETTER
September 14, 2021      Jennifer Miller  [address] [facility] [address]        Dear Jennifer,     This letter is to remind you that you are due for your annual exam with Eulalia Delgado. Your last comprehensive visit was more than 12 months ago.    Please call the clinic at [phone] to schedule your appointment.    If you are no longer seeing Eulalia Delgado for primary care, please call to let us know. Doing so will remove you from our call/contact list.    Thank you for choosing [facility] for your health care needs.    Sincerely,    Refill LEAH  [facility]

## 2021-09-14 RX ORDER — PAROXETINE 10 MG/1
TABLET, FILM COATED ORAL
Qty: 135 TABLET | Refills: 3 | Status: SHIPPED | OUTPATIENT
Start: 2021-09-14 | End: 2021-11-15

## 2021-09-14 NOTE — TELEPHONE ENCOUNTER
[facility] sent Rx request for the following:      Requested Prescriptions   Pending Prescriptions Disp Refills     PARoxetine (PAXIL) 10 MG tablet [Pharmacy Med Name: paroxetine 10 mg tablet] 135 tablet 3     Sig: TAKE 1 AND 1/2 TABLETS BY MOUTH EVERY MORNING   Last Prescription Date:   9/24/20  Last Fill Qty/Refills:         135, R-3    Last Office Visit:              9/24/20   Future Office visit:           None  Routing refill request to provider for review/approval because:    SSRIs Protocol Failed - 9/14/2021  2:01 PM        Failed - PHQ-9 score less than 5 in past 6 months        Failed - Recent (6 mo) or future (30 days) visit within the authorizing provider's specialty     Patient is overdue for 6 month check up and will be due for annual exam around 9/24. Reminder letter sent to Pt. Unable to complete prescription refill per RN Medication Refill Policy. Melissa Bruno RN .............. 9/14/2021  2:05 PM

## 2021-10-09 ENCOUNTER — HEALTH MAINTENANCE LETTER (OUTPATIENT)
Age: 66
End: 2021-10-09

## 2021-10-18 DIAGNOSIS — E78.00 PURE HYPERCHOLESTEROLEMIA: ICD-10-CM

## 2021-10-18 RX ORDER — SIMVASTATIN 20 MG
TABLET ORAL
Qty: 90 TABLET | Refills: 3 | Status: SHIPPED | OUTPATIENT
Start: 2021-10-18 | End: 2022-11-16

## 2021-10-18 NOTE — TELEPHONE ENCOUNTER
[facility] pharmacy sent Rx request for the following:     Requested Prescriptions   Pending Prescriptions Disp Refills     simvastatin (ZOCOR) 20 MG tablet [Pharmacy Med Name: simvastatin 20 mg tablet] 90 tablet 3     Sig: TAKE 1 TABLET BY MOUTH AT BEDTIME         Last Prescription Date:   9/24/2020  Last Fill Qty/Refills:         90, R-3    Last Office Visit:              9/24/2020  Future Office visit:           11/15/2021    Routing refill request to provider for review/approval because:  Has upcoming appointment.     Unable to complete prescription refill per RN Medication Refill Policy.................... Yarely Goode RN ....................  10/18/2021   3:56 PM

## 2021-11-14 ASSESSMENT — PATIENT HEALTH QUESTIONNAIRE - PHQ9
SUM OF ALL RESPONSES TO PHQ QUESTIONS 1-9: 0
SUM OF ALL RESPONSES TO PHQ QUESTIONS 1-9: 0

## 2021-11-14 ASSESSMENT — ANXIETY QUESTIONNAIRES
6. BECOMING EASILY ANNOYED OR IRRITABLE: NOT AT ALL
8. IF YOU CHECKED OFF ANY PROBLEMS, HOW DIFFICULT HAVE THESE MADE IT FOR YOU TO DO YOUR WORK, TAKE CARE OF THINGS AT HOME, OR GET ALONG WITH OTHER PEOPLE?: NOT DIFFICULT AT ALL
2. NOT BEING ABLE TO STOP OR CONTROL WORRYING: NOT AT ALL
3. WORRYING TOO MUCH ABOUT DIFFERENT THINGS: NOT AT ALL
GAD7 TOTAL SCORE: 0
1. FEELING NERVOUS, ANXIOUS, OR ON EDGE: NOT AT ALL
GAD7 TOTAL SCORE: 0
GAD7 TOTAL SCORE: 0
7. FEELING AFRAID AS IF SOMETHING AWFUL MIGHT HAPPEN: NOT AT ALL
4. TROUBLE RELAXING: NOT AT ALL
7. FEELING AFRAID AS IF SOMETHING AWFUL MIGHT HAPPEN: NOT AT ALL
5. BEING SO RESTLESS THAT IT IS HARD TO SIT STILL: NOT AT ALL

## 2021-11-15 ENCOUNTER — OFFICE VISIT (OUTPATIENT)
Dept: FAMILY MEDICINE | Facility: OTHER | Age: 66
End: 2021-11-15
Attending: FAMILY MEDICINE
Payer: COMMERCIAL

## 2021-11-15 VITALS
HEART RATE: 68 BPM | BODY MASS INDEX: 26.65 KG/M2 | SYSTOLIC BLOOD PRESSURE: 114 MMHG | HEIGHT: 67 IN | RESPIRATION RATE: 16 BRPM | DIASTOLIC BLOOD PRESSURE: 74 MMHG | WEIGHT: 169.8 LBS | TEMPERATURE: 96.9 F | OXYGEN SATURATION: 99 %

## 2021-11-15 DIAGNOSIS — Z00.00 HEALTH CARE MAINTENANCE: Primary | ICD-10-CM

## 2021-11-15 DIAGNOSIS — Z12.31 VISIT FOR SCREENING MAMMOGRAM: ICD-10-CM

## 2021-11-15 DIAGNOSIS — E03.9 HYPOTHYROIDISM, UNSPECIFIED TYPE: ICD-10-CM

## 2021-11-15 DIAGNOSIS — E78.00 PURE HYPERCHOLESTEROLEMIA: ICD-10-CM

## 2021-11-15 DIAGNOSIS — F34.1 DYSTHYMIC DISORDER: ICD-10-CM

## 2021-11-15 PROCEDURE — 99396 PREV VISIT EST AGE 40-64: CPT | Performed by: FAMILY MEDICINE

## 2021-11-15 RX ORDER — LEVOTHYROXINE SODIUM 88 UG/1
88 TABLET ORAL DAILY
Qty: 90 TABLET | Refills: 3 | Status: SHIPPED | OUTPATIENT
Start: 2021-11-15 | End: 2021-11-26

## 2021-11-15 RX ORDER — PAROXETINE 10 MG/1
TABLET, FILM COATED ORAL
Qty: 135 TABLET | Refills: 3 | Status: SHIPPED | OUTPATIENT
Start: 2021-11-15 | End: 2022-11-27

## 2021-11-15 ASSESSMENT — ANXIETY QUESTIONNAIRES: GAD7 TOTAL SCORE: 0

## 2021-11-15 ASSESSMENT — PATIENT HEALTH QUESTIONNAIRE - PHQ9: SUM OF ALL RESPONSES TO PHQ QUESTIONS 1-9: 0

## 2021-11-15 ASSESSMENT — ENCOUNTER SYMPTOMS
NERVOUS/ANXIOUS: 0
FEVER: 0
SHORTNESS OF BREATH: 0
CHILLS: 0
COUGH: 0

## 2021-11-15 ASSESSMENT — MIFFLIN-ST. JEOR: SCORE: 1348.87

## 2021-11-15 ASSESSMENT — PAIN SCALES - GENERAL: PAINLEVEL: SEVERE PAIN (6)

## 2021-11-15 NOTE — NURSING NOTE
"Chief Complaint   Patient presents with     Physical       Initial /74   Pulse 68   Temp 96.9  F (36.1  C) (Temporal)   Resp 16   Ht 1.695 m (5' 6.75\")   Wt 77 kg (169 lb 12.8 oz)   SpO2 99%   Breastfeeding No   BMI 26.79 kg/m   Estimated body mass index is 26.79 kg/m  as calculated from the following:    Height as of this encounter: 1.695 m (5' 6.75\").    Weight as of this encounter: 77 kg (169 lb 12.8 oz).  Medication Reconciliation: complete    FOOD SECURITY SCREENING QUESTIONS  Hunger Vital Signs:  Within the past 12 months we worried whether our food would run out before we got money to buy more. Never  Within the past 12 months the food we bought just didn't last and we didn't have money to get more. Never    Vanessa Ramos, DAVID  "

## 2021-11-15 NOTE — PROGRESS NOTES
"  SUBJECTIVE:   Nursing Notes:   Vanessa Ramos LPN  11/15/2021  2:27 PM  Sign at exiting of workspace  Chief Complaint   Patient presents with     Physical       Initial /74   Pulse 68   Temp 96.9  F (36.1  C) (Temporal)   Resp 16   Ht 1.695 m (5' 6.75\")   Wt 77 kg (169 lb 12.8 oz)   SpO2 99%   Breastfeeding No   BMI 26.79 kg/m   Estimated body mass index is 26.79 kg/m  as calculated from the following:    Height as of this encounter: 1.695 m (5' 6.75\").    Weight as of this encounter: 77 kg (169 lb 12.8 oz).  Medication Reconciliation: complete    FOOD SECURITY SCREENING QUESTIONS  Hunger Vital Signs:  Within the past 12 months we worried whether our food would run out before we got money to buy more. Never  Within the past 12 months the food we bought just didn't last and we didn't have money to get more. Never    Vanessa Ramos LPN      Jennifer Miller is a 66 year old female who presents to clinic today for a physical.  She is doing well.  She has had a stressful year.  She is an .  This has been a rough fall with the Covid epidemic, but she feels she is doing as well as she can.  She is stable on her current dose of paxil.  She needs a refill of this as her Levothyroxine.    HPI    I personally reviewed medications/allergies/history listed below:    Patient Active Problem List    Diagnosis Date Noted     Sigmoid diverticulosis 01/25/2021     Priority: Medium     H/O adenomatous polyp of colon 01/11/2021     Priority: Medium     Collagenous colitis 01/11/2021     Priority: Medium     Dysthymic disorder 02/17/2018     Priority: Medium     Other disorder of menstruation and other abnormal bleeding from female genital tract 02/17/2018     Priority: Medium     Headache 02/17/2018     Priority: Medium     Overview:   persistent       Hypothyroidism 02/17/2018     Priority: Medium     Weight gain 02/17/2018     Priority: Medium     Hyperlipidemia 10/21/2015     " Priority: Medium     Past Medical History:   Diagnosis Date     Hypothyroidism     No Comments Provided     Personal history of other medical treatment (CODE)     G2, P2      Past Surgical History:   Procedure Laterality Date     COLONOSCOPY      2007,10 year f/u     COLONOSCOPY  12/08/2017    tubular adenomas follow up 2022     COLONOSCOPY N/A 01/25/2021    F/U 2031. Diverticulosis. Collangenous colitis, follow up 10 years, 1/25/2031     OTHER SURGICAL HISTORY      2007,[id number],OTHER     Family History   Problem Relation Age of Onset     Other - See Comments Brother         obese     Family History Negative Brother         Good Health     Heart Disease Mother         Heart Disease     Other - See Comments Father      Breast Cancer Paternal Aunt         Cancer-breast     Social History     Tobacco Use     Smoking status: Never Smoker     Smokeless tobacco: Never Used   Substance Use Topics     Alcohol use: Yes     Comment: Alcoholic Drinks/day: a glass of wine every night     Social History     Social History Narrative    .  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  Her , Coy, is a physical therapist at [employer].  2 sons.  [facility] in [place], [place]     Current Outpatient Medications   Medication Sig Dispense Refill     levothyroxine (SYNTHROID/LEVOTHROID) 88 MCG tablet Take 1 tablet (88 mcg) by mouth daily 90 tablet 3     PARoxetine (PAXIL) 10 MG tablet TAKE 1 AND 1/2 TABLETS BY MOUTH EVERY MORNING 135 tablet 3     simvastatin (ZOCOR) 20 MG tablet TAKE 1 TABLET BY MOUTH AT BEDTIME 90 tablet 3     Allergies   Allergen Reactions     Penicillins Unknown     Sulfa Drugs Hives       Review of Systems   Constitutional: Negative for chills and fever.   Respiratory: Negative for cough and shortness of breath.    Cardiovascular: Negative for peripheral edema.   Psychiatric/Behavioral: Negative for mood changes. The patient is not nervous/anxious.      "    OBJECTIVE:     /74   Pulse 68   Temp 96.9  F (36.1  C) (Temporal)   Resp 16   Ht 1.695 m (5' 6.75\")   Wt 77 kg (169 lb 12.8 oz)   SpO2 99%   Breastfeeding No   BMI 26.79 kg/m    Body mass index is 26.79 kg/m .  Physical Exam  Constitutional:       General: She is not in acute distress.     Appearance: She is well-developed.   HENT:      Head: Normocephalic.      Right Ear: Tympanic membrane and external ear normal.      Left Ear: Tympanic membrane and external ear normal.      Nose: Nose normal.      Mouth/Throat:      Pharynx: No oropharyngeal exudate.   Eyes:      General:         Right eye: No discharge.         Left eye: No discharge.      Conjunctiva/sclera: Conjunctivae normal.      Pupils: Pupils are equal, round, and reactive to light.   Neck:      Thyroid: No thyromegaly.      Trachea: No tracheal deviation.   Cardiovascular:      Rate and Rhythm: Normal rate and regular rhythm.      Pulses: Normal pulses.      Heart sounds: Normal heart sounds, S1 normal and S2 normal. No murmur heard.  No friction rub. No gallop. No S3 or S4 sounds.    Pulmonary:      Effort: Pulmonary effort is normal. No respiratory distress.      Breath sounds: Normal breath sounds. No wheezing or rales.      Comments: Breast exam:  No masses palpable bilaterally.  No skin changes, tethering or axillary lymphadenopathy bilaterally.    Abdominal:      General: Bowel sounds are normal. There is no distension.      Palpations: Abdomen is soft. There is no mass.      Tenderness: There is no abdominal tenderness.   Genitourinary:     Comments: Pelvic/Rectal exams deferred per patient.  Musculoskeletal:         General: Normal range of motion.      Cervical back: Neck supple.   Lymphadenopathy:      Cervical: No cervical adenopathy.   Skin:     General: Skin is warm and dry.      Findings: No rash.   Neurological:      Mental Status: She is alert and oriented to person, place, and time.      Motor: No abnormal muscle tone. "      Deep Tendon Reflexes: Reflexes are normal and symmetric.   Psychiatric:         Thought Content: Thought content normal.         Judgment: Judgment normal.           PHQ-9 SCORE 5/27/2014 10/14/2019 11/14/2021   PHQ-9 Total Score MyChart - - 0   PHQ-9 Total Score 0 0 0       PHQ-2 Score:     PHQ-2 ( 1999 Pfizer) 8/17/2018   Q1: Little interest or pleasure in doing things 0   Q2: Feeling down, depressed or hopeless 0   PHQ-2 Score 0       BRINDA-7 SCORE 6/15/2015 11/14/2021   Total Score - 0 (minimal anxiety)   Total Score 1 0         I personally reviewed results withpatient as listed below:   Diagnostic Test Results:  none     ASSESSMENT/PLAN:       ICD-10-CM    1. Health care maintenance  Z00.00    2. Visit for screening mammogram  Z12.31 MA Screen Bilateral w/Donte   3. Hypothyroidism, unspecified type  E03.9 levothyroxine (SYNTHROID/LEVOTHROID) 88 MCG tablet     TSH Reflex GH   4. Dysthymic disorder  F34.1 PARoxetine (PAXIL) 10 MG tablet   5. Pure hypercholesterolemia  E78.00 Lipid Panel     Comprehensive metabolic panel       1.  Mammogram ordered.  DEXA last completed on 6/19/15 was normal.  Pap Smear/HPV last completed on 8/8/17 was normal.  Colonoscopy last completed 12/8/17 showed tubular adenomas.  5 year follow up was recommended.  Flu shot is up to date.  Had 2 doses of covid vaccine and booster is scheduled for 11/18/2021.  tdap was on 6/15/15.  shingrix series completed.  2.  Mammogram as above.  3.  Levothyroxine refilled.  TSH ordered.  4.  paxil refilled.  5.  Lipid profile and Comprehensive Metabolic Profile ordered.    Eulalia Delgado MD  [facility]        Answers for HPI/ROS submitted by the patient on 11/14/2021  PHQ9 TOTAL SCORE: 0  BRINDA 7 TOTAL SCORE: 0

## 2021-11-24 ENCOUNTER — LAB (OUTPATIENT)
Dept: LAB | Facility: OTHER | Age: 66
End: 2021-11-24
Attending: FAMILY MEDICINE
Payer: COMMERCIAL

## 2021-11-24 DIAGNOSIS — E78.00 PURE HYPERCHOLESTEROLEMIA: ICD-10-CM

## 2021-11-24 DIAGNOSIS — E03.9 HYPOTHYROIDISM, UNSPECIFIED TYPE: ICD-10-CM

## 2021-11-24 LAB
ALBUMIN SERPL-MCNC: 4.3 G/DL (ref 3.5–5.7)
ALP SERPL-CCNC: 60 U/L (ref 34–104)
ALT SERPL W P-5'-P-CCNC: 14 U/L (ref 7–52)
ANION GAP SERPL CALCULATED.3IONS-SCNC: 6 MMOL/L (ref 3–14)
AST SERPL W P-5'-P-CCNC: 17 U/L (ref 13–39)
BILIRUB SERPL-MCNC: 0.4 MG/DL (ref 0.3–1)
BUN SERPL-MCNC: 15 MG/DL (ref 7–25)
CALCIUM SERPL-MCNC: 9.6 MG/DL (ref 8.6–10.3)
CHLORIDE BLD-SCNC: 104 MMOL/L (ref 98–107)
CHOLEST SERPL-MCNC: 191 MG/DL
CO2 SERPL-SCNC: 30 MMOL/L (ref 21–31)
CREAT SERPL-MCNC: 0.82 MG/DL (ref 0.6–1.2)
FASTING STATUS PATIENT QL REPORTED: YES
GFR SERPL CREATININE-BSD FRML MDRD: 76 ML/MIN/1.73M2
GLUCOSE BLD-MCNC: 99 MG/DL (ref 70–105)
HDLC SERPL-MCNC: 57 MG/DL (ref 23–92)
LDLC SERPL CALC-MCNC: 117 MG/DL
NONHDLC SERPL-MCNC: 134 MG/DL
POTASSIUM BLD-SCNC: 4.5 MMOL/L (ref 3.5–5.1)
PROT SERPL-MCNC: 6.7 G/DL (ref 6.4–8.9)
SODIUM SERPL-SCNC: 140 MMOL/L (ref 134–144)
T4 FREE SERPL-MCNC: 0.79 NG/DL (ref 0.6–1.6)
TRIGL SERPL-MCNC: 83 MG/DL
TSH SERPL DL<=0.005 MIU/L-ACNC: 4.14 MU/L (ref 0.4–4)

## 2021-11-24 PROCEDURE — 36415 COLL VENOUS BLD VENIPUNCTURE: CPT | Mod: ZL

## 2021-11-24 PROCEDURE — 80061 LIPID PANEL: CPT | Mod: ZL

## 2021-11-24 PROCEDURE — 84443 ASSAY THYROID STIM HORMONE: CPT | Mod: ZL

## 2021-11-24 PROCEDURE — 84295 ASSAY OF SERUM SODIUM: CPT | Mod: ZL

## 2021-11-24 PROCEDURE — 84439 ASSAY OF FREE THYROXINE: CPT | Mod: ZL

## 2021-11-26 DIAGNOSIS — E03.9 HYPOTHYROIDISM, UNSPECIFIED TYPE: ICD-10-CM

## 2021-11-26 RX ORDER — LEVOTHYROXINE SODIUM 100 UG/1
100 TABLET ORAL DAILY
Qty: 90 TABLET | Refills: 3 | Status: SHIPPED | OUTPATIENT
Start: 2021-11-26 | End: 2022-02-11

## 2021-12-22 ENCOUNTER — HOSPITAL ENCOUNTER (OUTPATIENT)
Dept: MAMMOGRAPHY | Facility: OTHER | Age: 66
Discharge: HOME | End: 2021-12-22
Attending: FAMILY MEDICINE | Admitting: FAMILY MEDICINE
Payer: COMMERCIAL

## 2021-12-22 DIAGNOSIS — Z12.31 VISIT FOR SCREENING MAMMOGRAM: ICD-10-CM

## 2021-12-22 PROCEDURE — 77063 BREAST TOMOSYNTHESIS BI: CPT

## 2022-02-09 ENCOUNTER — LAB (OUTPATIENT)
Dept: LAB | Facility: OTHER | Age: 67
End: 2022-02-09
Attending: FAMILY MEDICINE
Payer: COMMERCIAL

## 2022-02-09 DIAGNOSIS — E03.9 HYPOTHYROIDISM, UNSPECIFIED TYPE: ICD-10-CM

## 2022-02-09 LAB
T4 FREE SERPL-MCNC: 0.9 NG/DL (ref 0.6–1.6)
TSH SERPL DL<=0.005 MIU/L-ACNC: 4.8 MU/L (ref 0.4–4)

## 2022-02-09 PROCEDURE — 84443 ASSAY THYROID STIM HORMONE: CPT | Mod: ZL

## 2022-02-09 PROCEDURE — 36415 COLL VENOUS BLD VENIPUNCTURE: CPT | Mod: ZL

## 2022-02-09 PROCEDURE — 84439 ASSAY OF FREE THYROXINE: CPT | Mod: ZL

## 2022-02-11 DIAGNOSIS — E03.9 HYPOTHYROIDISM, UNSPECIFIED TYPE: ICD-10-CM

## 2022-02-11 RX ORDER — LEVOTHYROXINE SODIUM 112 UG/1
112 TABLET ORAL DAILY
Qty: 90 TABLET | Refills: 3 | Status: SHIPPED | OUTPATIENT
Start: 2022-02-11 | End: 2022-12-29

## 2022-03-30 ENCOUNTER — LAB (OUTPATIENT)
Dept: LAB | Facility: OTHER | Age: 67
End: 2022-03-30
Attending: FAMILY MEDICINE
Payer: COMMERCIAL

## 2022-03-30 DIAGNOSIS — E03.9 HYPOTHYROIDISM, UNSPECIFIED TYPE: ICD-10-CM

## 2022-03-30 LAB — TSH SERPL DL<=0.005 MIU/L-ACNC: 1.85 MU/L (ref 0.4–4)

## 2022-03-30 PROCEDURE — 84443 ASSAY THYROID STIM HORMONE: CPT | Mod: ZL

## 2022-03-30 PROCEDURE — 36415 COLL VENOUS BLD VENIPUNCTURE: CPT | Mod: ZL

## 2022-06-08 ENCOUNTER — ALLIED HEALTH/NURSE VISIT (OUTPATIENT)
Dept: FAMILY MEDICINE | Facility: OTHER | Age: 67
End: 2022-06-08
Payer: COMMERCIAL

## 2022-06-08 DIAGNOSIS — Z23 HIGH PRIORITY FOR 2019-NCOV VACCINE: Primary | ICD-10-CM

## 2022-06-08 PROCEDURE — 91305 COVID-19,PF,PFIZER (12+ YRS): CPT

## 2022-06-08 PROCEDURE — 0054A COVID-19,PF,PFIZER (12+ YRS): CPT

## 2022-11-10 NOTE — TELEPHONE ENCOUNTER
Patient Information     Patient Name MRN Jennifer Maldonado [id number] Female 1955      Telephone Encounter by Eulalia Delgado MD at 2017 10:18 AM     Author:  Eulalia Delgado MD Service:  (none) Author Type:  Physician     Filed:  2017 10:18 AM Encounter Date:  2017 Status:  Signed     :  Eulalia Delgado MD (Physician)            Both medications refilled.  Eulalia Delgado MD ....................  2017   10:18 AM          Pt back from CT.       Sue Ayon RN  11/09/22 [id number]

## 2022-11-13 DIAGNOSIS — E78.00 PURE HYPERCHOLESTEROLEMIA: ICD-10-CM

## 2022-11-16 RX ORDER — SIMVASTATIN 20 MG
TABLET ORAL
Qty: 90 TABLET | Refills: 0 | Status: SHIPPED | OUTPATIENT
Start: 2022-11-16 | End: 2022-12-29

## 2022-11-16 NOTE — TELEPHONE ENCOUNTER
[facility] sent Rx request for the following:      Requested Prescriptions   Pending Prescriptions Disp Refills     simvastatin (ZOCOR) 20 MG tablet [Pharmacy Med Name: simvastatin 20 mg tablet] 90 tablet 3     Sig: TAKE 1 TABLET BY MOUTH AT BEDTIME   Last Prescription Date:   10/18/21  Last Fill Qty/Refills:         90, R-3    Last Office Visit:              11/15/21   Future Office visit:           None    Pt due for annual exam. Routing to provider for refill consideration. Routing to  OUTREACH APPT REQUESTS pool, to assist Pt in scheduling appointment.     In clinical absence of patient's primary, Eulalia Delgado, patient is requesting that this message be sent to the covering provider for consideration please.    Melissa Bruno RN .............. 11/16/2022  12:19 PM

## 2022-11-16 NOTE — TELEPHONE ENCOUNTER
LMTCB to schedule appointment.    Pt is due for annual exam.    Faiza Whittaker on 11/16/2022 at 1:57 PM

## 2022-11-24 DIAGNOSIS — F34.1 DYSTHYMIC DISORDER: ICD-10-CM

## 2022-11-25 NOTE — TELEPHONE ENCOUNTER
[facility] sent Rx request for the following:      paroxetine 10 mg tablet      Last Prescription Date:   11/15/2021  Last Fill Qty/Refills:         135, R-3    Last Office Visit:              11/15/2021   Future Office visit:           12/29/2022    Hiram Duckworth RN, BSN  ....................  11/25/2022   3:28 PM

## 2022-11-27 RX ORDER — PAROXETINE 10 MG/1
TABLET, FILM COATED ORAL
Qty: 135 TABLET | Refills: 3 | Status: SHIPPED | OUTPATIENT
Start: 2022-11-27 | End: 2022-12-29

## 2022-12-26 PROCEDURE — 0124A COVID-19 VACCINE BIVALENT BOOSTER 12+ (PFIZER): CPT | Performed by: FAMILY MEDICINE

## 2022-12-26 PROCEDURE — 91312 COVID-19 VACCINE BIVALENT BOOSTER 12+ (PFIZER): CPT | Performed by: FAMILY MEDICINE

## 2022-12-27 NOTE — PROGRESS NOTES
Pre-Visit Planning   Next 5 appointments (look out 90 days)    Dec 29, 2022  2:00 PM  PHYSICAL with Eulalia Delgado MD  [facility] ([facility] ) [address]  [place] [id number] [phone]        Appointment Notes for this encounter:   Medicare Wellness Px    Questionnaires Reviewed/Assigned  Additional questionnaires assigned: PHQ-9 and Fall Risk Assessment     Patient preferred phone number: [phone]    Unable to reach. Left voicemail. Advised patient to call clinic back at [phone] and talk with the Unit 4 Care Team RN.    Bela Verdin, RN on 12/27/2022 at 10:10 AM

## 2022-12-28 ASSESSMENT — ENCOUNTER SYMPTOMS
FEVER: 0
BREAST MASS: 0
HEARTBURN: 0
ARTHRALGIAS: 0
ABDOMINAL PAIN: 0
NERVOUS/ANXIOUS: 0
DIZZINESS: 0
DIARRHEA: 0
CONSTIPATION: 0
SORE THROAT: 0
DYSURIA: 0
NAUSEA: 0
HEADACHES: 0
COUGH: 0
SHORTNESS OF BREATH: 0
PALPITATIONS: 0
JOINT SWELLING: 0
PARESTHESIAS: 0
EYE PAIN: 0
CHILLS: 0
MYALGIAS: 0
HEMATOCHEZIA: 0
FREQUENCY: 0
WEAKNESS: 0
HEMATURIA: 0

## 2022-12-28 ASSESSMENT — ACTIVITIES OF DAILY LIVING (ADL): CURRENT_FUNCTION: NO ASSISTANCE NEEDED

## 2022-12-28 ASSESSMENT — PATIENT HEALTH QUESTIONNAIRE - PHQ9
10. IF YOU CHECKED OFF ANY PROBLEMS, HOW DIFFICULT HAVE THESE PROBLEMS MADE IT FOR YOU TO DO YOUR WORK, TAKE CARE OF THINGS AT HOME, OR GET ALONG WITH OTHER PEOPLE: NOT DIFFICULT AT ALL
SUM OF ALL RESPONSES TO PHQ QUESTIONS 1-9: 1
SUM OF ALL RESPONSES TO PHQ QUESTIONS 1-9: 1

## 2022-12-29 ENCOUNTER — LAB (OUTPATIENT)
Dept: LAB | Facility: OTHER | Age: 67
End: 2022-12-29
Attending: FAMILY MEDICINE
Payer: COMMERCIAL

## 2022-12-29 ENCOUNTER — OFFICE VISIT (OUTPATIENT)
Dept: FAMILY MEDICINE | Facility: OTHER | Age: 67
End: 2022-12-29
Attending: FAMILY MEDICINE
Payer: COMMERCIAL

## 2022-12-29 VITALS
DIASTOLIC BLOOD PRESSURE: 78 MMHG | TEMPERATURE: 97.6 F | SYSTOLIC BLOOD PRESSURE: 132 MMHG | HEART RATE: 64 BPM | OXYGEN SATURATION: 97 % | RESPIRATION RATE: 20 BRPM | BODY MASS INDEX: 26.85 KG/M2 | WEIGHT: 170.13 LBS

## 2022-12-29 DIAGNOSIS — E78.00 PURE HYPERCHOLESTEROLEMIA: ICD-10-CM

## 2022-12-29 DIAGNOSIS — E03.9 HYPOTHYROIDISM, UNSPECIFIED TYPE: ICD-10-CM

## 2022-12-29 DIAGNOSIS — Z23 NEED FOR VACCINATION FOR PNEUMOCOCCUS: ICD-10-CM

## 2022-12-29 DIAGNOSIS — Z11.4 SCREENING FOR HIV (HUMAN IMMUNODEFICIENCY VIRUS): ICD-10-CM

## 2022-12-29 DIAGNOSIS — Z78.0 POSTMENOPAUSE: ICD-10-CM

## 2022-12-29 DIAGNOSIS — Z13.6 SCREENING FOR AAA (AORTIC ABDOMINAL ANEURYSM): ICD-10-CM

## 2022-12-29 DIAGNOSIS — Z00.00 ENCOUNTER FOR MEDICARE ANNUAL WELLNESS EXAM: Primary | ICD-10-CM

## 2022-12-29 DIAGNOSIS — Z23 NEED FOR COVID-19 VACCINE: ICD-10-CM

## 2022-12-29 DIAGNOSIS — Z11.59 NEED FOR HEPATITIS C SCREENING TEST: ICD-10-CM

## 2022-12-29 DIAGNOSIS — F34.1 DYSTHYMIC DISORDER: ICD-10-CM

## 2022-12-29 LAB
ALBUMIN SERPL BCG-MCNC: 4.1 G/DL (ref 3.5–5.2)
ALP SERPL-CCNC: 63 U/L (ref 35–104)
ALT SERPL W P-5'-P-CCNC: 16 U/L (ref 10–35)
ANION GAP SERPL CALCULATED.3IONS-SCNC: 6 MMOL/L (ref 7–15)
AST SERPL W P-5'-P-CCNC: 21 U/L (ref 10–35)
BILIRUB SERPL-MCNC: 0.6 MG/DL
BUN SERPL-MCNC: 13.5 MG/DL (ref 8–23)
CALCIUM SERPL-MCNC: 9.1 MG/DL (ref 8.8–10.2)
CHLORIDE SERPL-SCNC: 99 MMOL/L (ref 98–107)
CHOLEST SERPL-MCNC: 197 MG/DL
CREAT SERPL-MCNC: 0.82 MG/DL (ref 0.51–0.95)
DEPRECATED HCO3 PLAS-SCNC: 28 MMOL/L (ref 22–29)
GFR SERPL CREATININE-BSD FRML MDRD: 79 ML/MIN/1.73M2
GLUCOSE SERPL-MCNC: 94 MG/DL (ref 70–99)
HDLC SERPL-MCNC: 66 MG/DL
HOLD SPECIMEN: NORMAL
LDLC SERPL CALC-MCNC: 114 MG/DL
NONHDLC SERPL-MCNC: 131 MG/DL
POTASSIUM SERPL-SCNC: 4.3 MMOL/L (ref 3.4–5.3)
PROT SERPL-MCNC: 6.5 G/DL (ref 6.4–8.3)
SODIUM SERPL-SCNC: 133 MMOL/L (ref 136–145)
TRIGL SERPL-MCNC: 86 MG/DL
TSH SERPL DL<=0.005 MIU/L-ACNC: 1.49 UIU/ML (ref 0.3–4.2)

## 2022-12-29 PROCEDURE — 90471 IMMUNIZATION ADMIN: CPT | Performed by: FAMILY MEDICINE

## 2022-12-29 PROCEDURE — 36415 COLL VENOUS BLD VENIPUNCTURE: CPT | Mod: ZL

## 2022-12-29 PROCEDURE — G0402 INITIAL PREVENTIVE EXAM: HCPCS | Performed by: FAMILY MEDICINE

## 2022-12-29 PROCEDURE — 80061 LIPID PANEL: CPT | Mod: ZL

## 2022-12-29 PROCEDURE — 90677 PCV20 VACCINE IM: CPT | Performed by: FAMILY MEDICINE

## 2022-12-29 PROCEDURE — 80053 COMPREHEN METABOLIC PANEL: CPT | Mod: ZL

## 2022-12-29 PROCEDURE — 84443 ASSAY THYROID STIM HORMONE: CPT | Mod: ZL

## 2022-12-29 RX ORDER — PAROXETINE 10 MG/1
TABLET, FILM COATED ORAL
Qty: 135 TABLET | Refills: 4 | Status: SHIPPED | OUTPATIENT
Start: 2022-12-29 | End: 2024-01-29

## 2022-12-29 RX ORDER — LEVOTHYROXINE SODIUM 112 UG/1
112 TABLET ORAL DAILY
Qty: 90 TABLET | Refills: 4 | Status: SHIPPED | OUTPATIENT
Start: 2022-12-29 | End: 2024-01-29

## 2022-12-29 RX ORDER — SIMVASTATIN 20 MG
20 TABLET ORAL AT BEDTIME
Qty: 90 TABLET | Refills: 4 | Status: SHIPPED | OUTPATIENT
Start: 2022-12-29 | End: 2024-01-08

## 2022-12-29 ASSESSMENT — PATIENT HEALTH QUESTIONNAIRE - PHQ9: SUM OF ALL RESPONSES TO PHQ QUESTIONS 1-9: 1

## 2022-12-29 ASSESSMENT — PAIN SCALES - GENERAL: PAINLEVEL: NO PAIN (0)

## 2022-12-29 NOTE — PROGRESS NOTES
SUBJECTIVE:   Jennifer Miller is a 67 year old female who presents for Preventive Visit.      Patient has been advised of split billing requirements and indicates understanding: Yes     Physical Health:    In general, how would you rate your overall physical health? good    Outside of work, how many days during the week do you exercise? 4-5 days/week    Outside of work, approximately how many minutes a day do you exercise?30-45 minutes  If you drink alcohol do you typically have >3 drinks per day or >7 drinks per week? Yes - AUDIT SCORE:       Do you have any problems taking medications regularly?  No    Do you have any side effects from medications? none    Needs assistance for the following daily activities: no assistance needed    Which of the following safety concerns are present in your home?  none identified     Hearing impairment: No    In the past 6 months, have you been bothered by leaking of urine? no    Mental Health:    In general, how would you rate your overall mental or emotional health? good  PHQ-2 Score: 0    Do you feel safe in your environment? Yes    Have you ever done Advance Care Planning? (For example, a Health Directive, POLST, or a discussion with a medical provider or your loved ones about your wishes): Yes, patient states has an Advance Care Planning document and will bring a copy to the clinic.    Additional concerns to address?      Fall risk:  Fallen 2 or more times in the past year?: No  Any fall with injury in the past year?: No    Cognitive Screenin) Repeat 3 items (Leader, Season, Table)    2) Clock draw: NORMAL  3) 3 item recall: Recalls 3 objects  Results: 3 items recalled: COGNITIVE IMPAIRMENT LESS LIKELY    Mini-CogTM Copyright SHIN Saldana. Licensed by the author for use in [facility]; reprinted with permission (jeff@.[facility]). All rights reserved.      Do you have sleep apnea, excessive snoring or daytime drowsiness?: no            Reviewed and updated as  needed this visit by clinical staff   Tobacco  Allergies  Meds              Reviewed and updated as needed this visit by Provider                 Social History     Tobacco Use     Smoking status: Never     Smokeless tobacco: Never   Substance Use Topics     Alcohol use: Yes     Comment: Alcoholic Drinks/day: a glass of wine every night                           Current providers sharing in care for this patient include:   Patient Care Team:  Eulalia Delgado MD as PCP - General (Family Practice)  Eulalia Delgado MD as Assigned PCP    The following health maintenance items are reviewed in Epic and correct as of today:  Health Maintenance   Topic Date Due     ADVANCE CARE PLANNING  Never done     HIV SCREENING  Never done     HEPATITIS C SCREENING  Never done     COVID-19 Vaccine (5 - Booster for Moderna series) 08/03/2022     MEDICARE ANNUAL WELLNESS VISIT  11/15/2022     Pneumococcal Vaccine: 65+ Years (1 - PCV) 10/13/2022     PHQ-9  06/29/2023     MAMMO SCREENING  12/22/2023     FALL RISK ASSESSMENT  12/29/2023     DTAP/TDAP/TD IMMUNIZATION (2 - Td or Tdap) 06/15/2025     LIPID  12/29/2027     DEXA  06/19/2030     COLORECTAL CANCER SCREENING  01/25/2031     DEPRESSION ACTION PLAN  Completed     INFLUENZA VACCINE  Completed     ZOSTER IMMUNIZATION  Completed     IPV IMMUNIZATION  Aged Out     MENINGITIS IMMUNIZATION  Aged Out     PAP  Discontinued     BP Readings from Last 3 Encounters:   12/29/22 132/78   11/15/21 114/74   01/25/21 103/53    Wt Readings from Last 3 Encounters:   12/29/22 77.2 kg (170 lb 2 oz)   11/15/21 77 kg (169 lb 12.8 oz)   01/25/21 80.3 kg (177 lb)                  Patient Active Problem List   Diagnosis     Dysthymic disorder     Other disorder of menstruation and other abnormal bleeding from female genital tract     Headache     Hyperlipidemia     Hypothyroidism     Weight gain     H/O adenomatous polyp of colon     Collagenous colitis     Sigmoid diverticulosis      "Past Surgical History:   Procedure Laterality Date     COLONOSCOPY      2007,10 year f/u     COLONOSCOPY  12/08/2017    tubular adenomas follow up 2022     COLONOSCOPY N/A 01/25/2021    F/U 2031. Diverticulosis. Collangenous colitis, follow up 10 years, 1/25/2031     OTHER SURGICAL HISTORY      2007,410418,OTHER       Social History     Tobacco Use     Smoking status: Never     Smokeless tobacco: Never   Substance Use Topics     Alcohol use: Yes     Comment: Alcoholic Drinks/day: a glass of wine every night     Family History   Problem Relation Age of Onset     Other - See Comments Brother         obese     Family History Negative Brother         Good Health     Heart Disease Mother         Heart Disease     Other - See Comments Father      Breast Cancer Paternal Aunt         Cancer-breast         Current Outpatient Medications   Medication Sig Dispense Refill     levothyroxine (SYNTHROID/LEVOTHROID) 112 MCG tablet Take 1 tablet (112 mcg) by mouth daily 90 tablet 3     PARoxetine (PAXIL) 10 MG tablet TAKE 1 AND 1/2 TABLETS BY MOUTH EVERY MORNING 135 tablet 3     simvastatin (ZOCOR) 20 MG tablet TAKE 1 TABLET BY MOUTH AT BEDTIME 90 tablet 0     Allergies   Allergen Reactions     Penicillins Unknown     Sulfa Drugs Hives         ROS:  Constitutional, HEENT, cardiovascular, pulmonary, GI, , musculoskeletal, neuro, skin, endocrine and psych systems are negative, except as otherwise noted.    OBJECTIVE:   /78 (BP Location: Right arm, Patient Position: Sitting, Cuff Size: Adult Regular)   Pulse 64   Temp 97.6  F (36.4  C)   Resp 20   Wt 77.2 kg (170 lb 2 oz)   SpO2 97%   BMI 26.85 kg/m   Estimated body mass index is 26.85 kg/m  as calculated from the following:    Height as of 11/15/21: 1.695 m (5' 6.75\").    Weight as of this encounter: 77.2 kg (170 lb 2 oz).  EXAM:   GENERAL APPEARANCE: healthy, alert and no distress  EYES: Eyes grossly normal to inspection, PERRL and conjunctivae and sclerae normal  HENT: " ear canals and TM's normal, nose and mouth without ulcers or lesions, oropharynx clear and oral mucous membranes moist  NECK: no adenopathy, no asymmetry, masses, or scars and thyroid normal to palpation  RESP: lungs clear to auscultation - no rales, rhonchi or wheezes  BREAST: normal without masses, tenderness or nipple discharge and no palpable axillary masses or adenopathy  CV: regular rate and rhythm, normal S1 S2, no S3 or S4, no murmur, click or rub, no peripheral edema and peripheral pulses strong  ABDOMEN: soft, nontender, no hepatosplenomegaly, no masses and bowel sounds normal  MS: no musculoskeletal defects are noted and gait is age appropriate without ataxia  SKIN: no suspicious lesions or rashes  NEURO: Normal strength and tone, sensory exam grossly normal, mentation intact and speech normal  PSYCH: mentation appears normal and affect normal/bright    Diagnostic Test Results:  Results for orders placed or performed in visit on 12/29/22   Lipid Panel     Status: Abnormal   Result Value Ref Range    Cholesterol 197 <200 mg/dL    Triglycerides 86 <150 mg/dL    Direct Measure HDL 66 >=50 mg/dL    LDL Cholesterol Calculated 114 (H) <=100 mg/dL    Non HDL Cholesterol 131 (H) <130 mg/dL    Narrative    Cholesterol  Desirable:  <200 mg/dL    Triglycerides  Normal:  Less than 150 mg/dL  Borderline High:  150-199 mg/dL  High:  200-499 mg/dL  Very High:  Greater than or equal to 500 mg/dL    Direct Measure HDL  Female:  Greater than or equal to 50 mg/dL   Male:  Greater than or equal to 40 mg/dL    LDL Cholesterol  Desirable:  <100mg/dL  Above Desirable:  100-129 mg/dL   Borderline High:  130-159 mg/dL   High:  160-189 mg/dL   Very High:  >= 190 mg/dL    Non HDL Cholesterol  Desirable:  130 mg/dL  Above Desirable:  130-159 mg/dL  Borderline High:  160-189 mg/dL  High:  190-219 mg/dL  Very High:  Greater than or equal to 220 mg/dL   TSH with free T4 reflex     Status: Normal   Result Value Ref Range    TSH 1.49  0.30 - 4.20 uIU/mL   Comprehensive Metabolic Panel     Status: Abnormal   Result Value Ref Range    Sodium 133 (L) 136 - 145 mmol/L    Potassium 4.3 3.4 - 5.3 mmol/L    Chloride 99 98 - 107 mmol/L    Carbon Dioxide (CO2) 28 22 - 29 mmol/L    Anion Gap 6 (L) 7 - 15 mmol/L    Urea Nitrogen 13.5 8.0 - 23.0 mg/dL    Creatinine 0.82 0.51 - 0.95 mg/dL    Calcium 9.1 8.8 - 10.2 mg/dL    Glucose 94 70 - 99 mg/dL    Alkaline Phosphatase 63 35 - 104 U/L    AST 21 10 - 35 U/L    ALT 16 10 - 35 U/L    Protein Total 6.5 6.4 - 8.3 g/dL    Albumin 4.1 3.5 - 5.2 g/dL    Bilirubin Total 0.6 <=1.2 mg/dL    GFR Estimate 79 >60 mL/min/1.73m2   Extra Tube     Status: None    Narrative    The following orders were created for panel order Extra Tube.  Procedure                               Abnormality         Status                     ---------                               -----------         ------                     Extra Serum Separator Tu...[[id number]]                      Final result                 Please view results for these tests on the individual orders.   Extra Serum Separator Tube (SST)     Status: None   Result Value Ref Range    Hold Specimen JI         ASSESSMENT / PLAN:       ICD-10-CM    1. Encounter for Medicare annual wellness exam  Z00.00       2. Screening for AAA (aortic abdominal aneurysm)  Z13.6 US Aorta Medicare AAA Screening      3. Need for hepatitis C screening test  Z11.59       4. Screening for HIV (human immunodeficiency virus)  Z11.4       5. Need for vaccination for pneumococcus  Z23 Pneumococcal 20 Valent Conjugate (PCV20)      6. Need for COVID-19 vaccine  Z23 COVID-19 VACCINE BIVALENT BOOSTER 12+ (PFIZER)      7. Postmenopause  Z78.0 DX Hip/Pelvis/Spine      8. Hypothyroidism, unspecified type  E03.9 levothyroxine (SYNTHROID/LEVOTHROID) 112 MCG tablet      9. Pure hypercholesterolemia  E78.00 simvastatin (ZOCOR) 20 MG tablet      10. Dysthymic disorder  F34.1 PARoxetine (PAXIL) 10 MG tablet     "    1.  Pneumogram is scheduled for 1/20/2023.  DEXA ordered.  Pap/HPV deferred due to age greater than 65.  Her last was done 5 years ago and was normal at that time.  Colonoscopy last completed 1/25/2021 and was normal.  10-year follow-up recommended.  AAA screening ultrasound ordered.  Flu is up-to-date.  COVID booster completed today.  Tdap last completed 6/15/2015.  Shingrix is up-to-date.  Prevnar completed today.  2.  See #1.  3.  She declined screening for hepatitis C and HIV.  4.  See #3.  5.  See #1.  6.  See #1.  7.  See #1.  8.  TSH is in good range.  Levothyroxine refilled.  9.  Lipids in good range.  Simvastatin refilled.  10.  Stable.  Paxil refilled.    Patient has been advised of split billing requirements and indicates understanding: Yes    COUNSELING:  Reviewed preventive health counseling, as reflected in patient instructions       Regular exercise       Healthy diet/nutrition       Vision screening       Osteoporosis prevention/bone health       Colon cancer screening       Hepatitis C screening       HIV screening for high risk patient    Estimated body mass index is 26.85 kg/m  as calculated from the following:    Height as of 11/15/21: 1.695 m (5' 6.75\").    Weight as of this encounter: 77.2 kg (170 lb 2 oz).    Weight management plan: Discussed healthy diet and exercise guidelines    She reports that she has never smoked. She has never used smokeless tobacco.    Appropriate preventive services were discussed with this patient, including applicable screening as appropriate for cardiovascular disease, diabetes, osteopenia/osteoporosis, and glaucoma.  As appropriate for age/gender, discussed screening for colorectal cancer, prostate cancer, breast cancer, and cervical cancer. Checklist reviewing preventive services available has been given to the patient.    Reviewed patients plan of care and provided an AVS. The Basic Care Plan (routine screening as documented in Health Maintenance) for Jennifer" "meets the Care Plan requirement. This Care Plan has been established and reviewed with the Patient.    Counseling Resources:  ATP IV Guidelines  Pooled Cohorts Equation Calculator  Breast Cancer Risk Calculator  BRCA-Related Cancer Risk Assessment: FHS-7 Tool  FRAX Risk Assessment  ICSI Preventive Guidelines  Dietary Guidelines for Americans, 2010  [employer]'s MyPlate  ASA Prophylaxis  Lung CA Screening    Eulalia Delgado MD  [facility]  Answers for HPI/ROS submitted by the patient on 12/28/2022  If you checked off any problems, how difficult have these problems made it for you to do your work, take care of things at home, or get along with other people?: Not difficult at all  PHQ9 TOTAL SCORE: 1  In general, how would you rate your overall physical health?: good  Frequency of exercise:: 4-5 days/week  Do you usually eat at least 4 servings of fruit and vegetables a day, include whole grains & fiber, and avoid regularly eating high fat or \"junk\" foods? : Yes  Taking medications regularly:: Yes  Medication side effects:: None  Activities of Daily Living: no assistance needed  Home safety: no safety concerns identified  Hearing Impairment:: no hearing concerns  In the past 6 months, have you been bothered by leaking of urine?: No  abdominal pain: No  Blood in stool: No  Blood in urine: No  chest pain: No  chills: No  congestion: No  constipation: No  cough: No  diarrhea: No  dizziness: No  ear pain: No  eye pain: No  nervous/anxious: No  fever: No  frequency: No  genital sores: No  headaches: No  hearing loss: No  heartburn: No  arthralgias: No  joint swelling: No  peripheral edema: No  mood changes: No  myalgias: No  nausea: No  dysuria: No  palpitations: No  Skin sensation changes: No  sore throat: No  urgency: No  rash: No  shortness of breath: No  visual disturbance: No  weakness: No  pelvic pain: No  vaginal bleeding: No  vaginal discharge: No  tenderness: No  breast mass: No  breast " discharge: No  In general, how would you rate your overall mental or emotional health?: good  Additional concerns today:: No  Duration of exercise:: 30-45 minutes

## 2022-12-29 NOTE — PATIENT INSTRUCTIONS
Patient Education   Personalized Prevention Plan  You are due for the preventive services outlined below.  Your care team is available to assist you in scheduling these services.  If you have already completed any of these items, please share that information with your care team to update in your medical record.  Health Maintenance Due   Topic Date Due     Discuss Advance Care Planning  Never done     HIV Screening  Never done     Hepatitis C Screening  Never done     COVID-19 Vaccine (5 - Booster for Moderna series) 08/03/2022     Annual Wellness Visit  11/15/2022     Pneumococcal Vaccine (1 - PCV) 10/13/2022

## 2023-01-20 ENCOUNTER — HOSPITAL ENCOUNTER (OUTPATIENT)
Dept: MAMMOGRAPHY | Facility: OTHER | Age: 68
Discharge: HOME | End: 2023-01-20
Attending: FAMILY MEDICINE | Admitting: FAMILY MEDICINE
Payer: COMMERCIAL

## 2023-01-20 DIAGNOSIS — Z12.31 VISIT FOR SCREENING MAMMOGRAM: ICD-10-CM

## 2023-01-20 PROCEDURE — 77067 SCR MAMMO BI INCL CAD: CPT

## 2023-02-21 ENCOUNTER — HOSPITAL ENCOUNTER (OUTPATIENT)
Dept: ULTRASOUND IMAGING | Facility: OTHER | Age: 68
Discharge: HOME | End: 2023-02-21
Attending: FAMILY MEDICINE
Payer: COMMERCIAL

## 2023-02-21 ENCOUNTER — HOSPITAL ENCOUNTER (OUTPATIENT)
Dept: BONE DENSITY | Facility: OTHER | Age: 68
Discharge: HOME | End: 2023-02-21
Attending: FAMILY MEDICINE
Payer: COMMERCIAL

## 2023-02-21 DIAGNOSIS — Z13.6 SCREENING FOR AAA (AORTIC ABDOMINAL ANEURYSM): ICD-10-CM

## 2023-02-21 DIAGNOSIS — Z78.0 POSTMENOPAUSE: ICD-10-CM

## 2023-02-21 PROCEDURE — 77080 DXA BONE DENSITY AXIAL: CPT

## 2023-02-21 PROCEDURE — 76706 US ABDL AORTA SCREEN AAA: CPT

## 2023-03-27 ENCOUNTER — DOCUMENTATION ONLY (OUTPATIENT)
Dept: OTHER | Facility: CLINIC | Age: 68
End: 2023-03-27
Payer: COMMERCIAL

## 2023-10-23 ENCOUNTER — ALLIED HEALTH/NURSE VISIT (OUTPATIENT)
Dept: FAMILY MEDICINE | Facility: OTHER | Age: 68
End: 2023-10-23
Attending: FAMILY MEDICINE
Payer: COMMERCIAL

## 2023-10-23 DIAGNOSIS — Z23 NEED FOR PROPHYLACTIC VACCINATION AND INOCULATION AGAINST INFLUENZA: Primary | ICD-10-CM

## 2023-10-23 DIAGNOSIS — Z23 HIGH PRIORITY FOR 2019-NCOV VACCINE: ICD-10-CM

## 2023-10-23 PROCEDURE — 90471 IMMUNIZATION ADMIN: CPT

## 2023-10-23 PROCEDURE — 90662 IIV NO PRSV INCREASED AG IM: CPT

## 2023-10-23 PROCEDURE — 90480 ADMN SARSCOV2 VAC 1/ONLY CMP: CPT

## 2023-10-23 PROCEDURE — 91320 SARSCV2 VAC 30MCG TRS-SUC IM: CPT

## 2023-10-23 NOTE — PROGRESS NOTES
We are scheduling all people aged 6 months and older for updated 3233-4053 COVID-19 vaccines.  [facility] will stock Pfizer COVID-19 vaccines in the clinics.  Patients who are up to date with COVID-19 vaccine will only need a single dose of the updated 5201-6156 vaccine, at least 8 weeks after last dose. Unvaccinated patients or those not up to date may have a different dosing schedule.  To schedule a COVID-19 vaccine appointment, please log in to [employer] using this link to see when and where we have openings (to schedule a child s vaccine, you will need to log into their [employer] account).     [facility] retail pharmacies also administer Pfizer COVID Vaccines to patients 5 years and older. [facility] offer Novavax primary COVID-19 vaccine.  To schedule at a [facility] pharmacy please go to [email or web address].    To learn more about COVID-19 vaccines in general, please visit the CDC website: https://www.cdc.gov/coronavirus/2019-ncov/vaccines/index.html     If you have technical difficulty using [employer], call [phone], option [phone] for assistance.    More information about vaccine effectiveness at reducing spread of disease, hospitalizations, and death as well as vaccine safety and answers to other questions can be found on our website: [email or web address].    Anna Cr LPN on 10/23/2023 at 1:17 PM

## 2023-11-09 ENCOUNTER — PATIENT OUTREACH (OUTPATIENT)
Dept: GASTROENTEROLOGY | Facility: CLINIC | Age: 68
End: 2023-11-09
Payer: COMMERCIAL

## 2023-12-19 ENCOUNTER — TELEPHONE (OUTPATIENT)
Dept: FAMILY MEDICINE | Facility: OTHER | Age: 68
End: 2023-12-19
Payer: COMMERCIAL

## 2023-12-19 DIAGNOSIS — E78.00 PURE HYPERCHOLESTEROLEMIA: ICD-10-CM

## 2023-12-19 DIAGNOSIS — E03.9 HYPOTHYROIDISM, UNSPECIFIED TYPE: Primary | ICD-10-CM

## 2023-12-19 DIAGNOSIS — Z13.0 SCREENING FOR DEFICIENCY ANEMIA: ICD-10-CM

## 2023-12-19 NOTE — TELEPHONE ENCOUNTER
Called and left message for patient to please return call. Lab orders are in. Patient needs to schedule a fasting lab only appt.     Amarilis Lacey LPN on 12/19/2023 at 4:09 PM

## 2023-12-19 NOTE — TELEPHONE ENCOUNTER
Reason for call: Request a lab order.    Order requested for what kind of lab? Labs before her px on 1/29/24.     Who is your PCP? [facility]    Preferred method for responding to this message: Telephone Call    Phone number patient can be reached at: Cell number on file:    Telephone Information:   Mobile [phone]       If we cannot reach you directly, may we leave a detailed response at the number you provided?  NA      Please call when placed an she will set up a lab appointment.       Gema Yu on 12/19/2023 at 10:39 AM

## 2023-12-19 NOTE — TELEPHONE ENCOUNTER
Orders placed for these labs as well as a Complete Blood Count since it has been a few years since that was last drawn.  Eulalia Delgado MD

## 2024-01-04 DIAGNOSIS — E78.00 PURE HYPERCHOLESTEROLEMIA: ICD-10-CM

## 2024-01-08 RX ORDER — SIMVASTATIN 20 MG
20 TABLET ORAL AT BEDTIME
Qty: 90 TABLET | Refills: 4 | Status: SHIPPED | OUTPATIENT
Start: 2024-01-08

## 2024-01-08 NOTE — TELEPHONE ENCOUNTER
simvastatin 20 mg tablet   Requested Prescriptions   Pending Prescriptions Disp Refills    simvastatin (ZOCOR) 20 MG tablet [Pharmacy Med Name: simvastatin 20 mg tablet] 90 tablet 4     Sig: Take 1 tablet (20 mg) by mouth At Bedtime       Statins Protocol Failed - 1/4/2024 12:10 PM        Failed - LDL on file in past 12 months     Recent Labs   Lab Test 12/29/22  0706   *           Passed - No abnormal creatine kinase in past 12 months     No lab results found.             Passed - Recent (12 mo) or future (30 days) visit within the authorizing provider's specialty     The patient must have completed an in-person or virtual visit within the past 12 months or has a future visit scheduled within the next 90 days with the authorizing provider s specialty.  Urgent care and e-visits do not quality as an office visit for this protocol.          Passed - Medication is active on med list        Passed - Patient is age 18 or older        Passed - No active pregnancy on record        Passed - No positive pregnancy test in past 12 months           simvastatin 20 mg tablet   Last Written Prescription Date:  12/29/22  Last Fill Quantity: 90,   # refills: 4  Last Office Visit: 12/29/22  Future Office visit:    Next 5 appointments (look out 90 days)      Jan 29, 2024 11:00 AM  (Arrive by 10:45 AM)  PHYSICAL with Eulalia Delgado MD  [facility] ([facility] ) [address]  [place] [id number] [phone]             Routing refill request to provider for review/approval because:  Drug not on the [facility], [facility] or [facility] refill protocol or controlled substance. Unable to complete prescription refill per RNMedication Refill Policy.................... Catherine Gomez RN ....................  1/8/2024   2:34 PM

## 2024-01-29 ENCOUNTER — OFFICE VISIT (OUTPATIENT)
Dept: FAMILY MEDICINE | Facility: OTHER | Age: 69
End: 2024-01-29
Attending: FAMILY MEDICINE
Payer: COMMERCIAL

## 2024-01-29 VITALS
RESPIRATION RATE: 16 BRPM | WEIGHT: 175.4 LBS | TEMPERATURE: 97.4 F | HEART RATE: 64 BPM | BODY MASS INDEX: 27.53 KG/M2 | DIASTOLIC BLOOD PRESSURE: 80 MMHG | OXYGEN SATURATION: 98 % | HEIGHT: 67 IN | SYSTOLIC BLOOD PRESSURE: 126 MMHG

## 2024-01-29 DIAGNOSIS — R32 URINARY INCONTINENCE, UNSPECIFIED TYPE: ICD-10-CM

## 2024-01-29 DIAGNOSIS — E03.9 HYPOTHYROIDISM, UNSPECIFIED TYPE: ICD-10-CM

## 2024-01-29 DIAGNOSIS — F34.1 DYSTHYMIC DISORDER: ICD-10-CM

## 2024-01-29 DIAGNOSIS — Z00.00 HEALTH CARE MAINTENANCE: Primary | ICD-10-CM

## 2024-01-29 PROCEDURE — 99397 PER PM REEVAL EST PAT 65+ YR: CPT | Mod: 25 | Performed by: FAMILY MEDICINE

## 2024-01-29 PROCEDURE — 90471 IMMUNIZATION ADMIN: CPT | Performed by: FAMILY MEDICINE

## 2024-01-29 PROCEDURE — 90678 RSV VACC PREF BIVALENT IM: CPT | Performed by: FAMILY MEDICINE

## 2024-01-29 RX ORDER — LEVOTHYROXINE SODIUM 112 UG/1
112 TABLET ORAL DAILY
Qty: 90 TABLET | Refills: 4 | Status: SHIPPED | OUTPATIENT
Start: 2024-01-29

## 2024-01-29 RX ORDER — PAROXETINE 10 MG/1
TABLET, FILM COATED ORAL
Qty: 135 TABLET | Refills: 4 | Status: SHIPPED | OUTPATIENT
Start: 2024-01-29

## 2024-01-29 ASSESSMENT — ENCOUNTER SYMPTOMS
PARESTHESIAS: 0
DIZZINESS: 0
ABDOMINAL PAIN: 0
WEAKNESS: 0
DYSURIA: 0
SHORTNESS OF BREATH: 0
DIARRHEA: 0
HEMATURIA: 0
COUGH: 0
MYALGIAS: 0
HEMATOCHEZIA: 0
PALPITATIONS: 0
HEADACHES: 0
FEVER: 0
CHILLS: 0
HEARTBURN: 0
NERVOUS/ANXIOUS: 0
FREQUENCY: 1
BREAST MASS: 0
CONSTIPATION: 0
ARTHRALGIAS: 0
SORE THROAT: 0
JOINT SWELLING: 0
NAUSEA: 0

## 2024-01-29 ASSESSMENT — PATIENT HEALTH QUESTIONNAIRE - PHQ9
10. IF YOU CHECKED OFF ANY PROBLEMS, HOW DIFFICULT HAVE THESE PROBLEMS MADE IT FOR YOU TO DO YOUR WORK, TAKE CARE OF THINGS AT HOME, OR GET ALONG WITH OTHER PEOPLE: NOT DIFFICULT AT ALL
SUM OF ALL RESPONSES TO PHQ QUESTIONS 1-9: 0
SUM OF ALL RESPONSES TO PHQ QUESTIONS 1-9: 0

## 2024-01-29 ASSESSMENT — PAIN SCALES - GENERAL: PAINLEVEL: NO PAIN (0)

## 2024-01-29 ASSESSMENT — ACTIVITIES OF DAILY LIVING (ADL): CURRENT_FUNCTION: NO ASSISTANCE NEEDED

## 2024-01-29 NOTE — PROGRESS NOTES
"Preventive Care Visit  [facility] AND hospitals  Eulalia Delgado MD, Family Medicine  Jan 29, 2024      SUBJECTIVE:   Jennifer is a 68 year old, presenting for the following:  Physical        1/29/2024    10:54 AM   Additional Questions   Roomed by Amarilis Lacey     Had a yeast infection this past fall.  Used monistat 1 day treatment and it didn't work.  Also had to urinate a lot.  Tried monistat 3 days after that and the dysuria, frequency and vaginal itching resolved.  She has been walking 5 miles a day 5 days a week.  During the summer, worked 3 miles and also did yard work.  Has had some urinary leaking ever since then.  Sometimes has urgency.  Sometimes has leaking with coughing and sneezing.        Healthy Habits:     In general, how would you rate your overall health?  Good    Frequency of exercise:  4-5 days/week    Duration of exercise:  45-60 minutes    Do you usually eat at least 4 servings of fruit and vegetables a day, include whole grains    & fiber and avoid regularly eating high fat or \"junk\" foods?  Yes    Taking medications regularly:  Yes    Medication side effects:  None    Ability to successfully perform activities of daily living:  No assistance needed    Home Safety:  No safety concerns identified    Hearing Impairment:  No hearing concerns    In the past 6 months, have you been bothered by leaking of urine? Yes    In general, how would you rate your overall mental or emotional health?  Excellent    Additional concerns today:  Yes    Today's PHQ-9 Score:       1/29/2024     9:59 AM   PHQ-9 SCORE   PHQ-9 Total Score MyChart 0   PHQ-9 Total Score 0             Social History     Tobacco Use    Smoking status: Never    Smokeless tobacco: Never   Substance Use Topics    Alcohol use: Yes     Comment: Alcoholic Drinks/day: a glass of wine every night             1/29/2024    10:06 AM   Alcohol Use   Prescreen: >3 drinks/day or >7 drinks/week? Yes   AUDIT SCORE  4       Last PSA: No " "results found for: \"PSA\"    Reviewed orders with patient. Reviewed health maintenance and updated orders accordingly - Yes  BP Readings from Last 3 Encounters:   01/29/24 126/80   12/29/22 132/78   11/15/21 114/74    Wt Readings from Last 3 Encounters:   01/29/24 79.6 kg (175 lb 6.4 oz)   12/29/22 77.2 kg (170 lb 2 oz)   11/15/21 77 kg (169 lb 12.8 oz)                  Patient Active Problem List   Diagnosis    Dysthymic disorder    Other disorder of menstruation and other abnormal bleeding from female genital tract    Headache    Hyperlipidemia    Hypothyroidism    Weight gain    H/O adenomatous polyp of colon    Collagenous colitis    Sigmoid diverticulosis     Past Surgical History:   Procedure Laterality Date    COLONOSCOPY      2007,10 year f/u    COLONOSCOPY  12/08/2017    tubular adenomas follow up 2022    COLONOSCOPY N/A 01/25/2021    F/U 2031. Diverticulosis. Collangenous colitis, follow up 10 years, 1/25/2031    OTHER SURGICAL HISTORY      2007,[id number],OTHER       Social History     Tobacco Use    Smoking status: Never    Smokeless tobacco: Never   Substance Use Topics    Alcohol use: Yes     Comment: Alcoholic Drinks/day: a glass of wine every night     Family History   Problem Relation Age of Onset    Other - See Comments Brother         obese    Family History Negative Brother         Good Health    Heart Disease Mother         Heart Disease    Other - See Comments Father     Breast Cancer Paternal Aunt         Cancer-breast         Current Outpatient Medications   Medication Sig Dispense Refill    levothyroxine (SYNTHROID/LEVOTHROID) 112 MCG tablet Take 1 tablet (112 mcg) by mouth daily 90 tablet 4    PARoxetine (PAXIL) 10 MG tablet TAKE 1 AND 1/2 TABLETS BY MOUTH EVERY MORNING Strength: 10 mg 135 tablet 4    simvastatin (ZOCOR) 20 MG tablet Take 1 tablet (20 mg) by mouth At Bedtime 90 tablet 4     Allergies   Allergen Reactions    Penicillins Unknown    Sulfa Antibiotics Hives       Reviewed and " "updated as needed this visit by clinical staff   Tobacco  Allergies  Meds  Problems             Reviewed and updated as needed this visit by Provider    Allergies  Meds  Problems             Past Medical History:   Diagnosis Date    Hypothyroidism     No Comments Provided    Personal history of other medical treatment (CODE)     G2, P2      Past Surgical History:   Procedure Laterality Date    COLONOSCOPY      2007,10 year f/u    COLONOSCOPY  12/08/2017    tubular adenomas follow up 2022    COLONOSCOPY N/A 01/25/2021    F/U 2031. Diverticulosis. Collangenous colitis, follow up 10 years, 1/25/2031    OTHER SURGICAL HISTORY      2007,[id number],OTHER     Review of Systems   Constitutional:  Negative for chills and fever.   HENT:  Negative for congestion, ear pain, hearing loss and sore throat.    Eyes:  Negative for visual disturbance.   Respiratory:  Negative for cough and shortness of breath.    Cardiovascular:  Negative for chest pain and palpitations.   Gastrointestinal:  Negative for abdominal pain, constipation, diarrhea and nausea.   Genitourinary:  Positive for frequency. Negative for dysuria, genital sores, hematuria, pelvic pain, urgency, vaginal bleeding and vaginal discharge.   Musculoskeletal:  Negative for arthralgias, joint swelling and myalgias.   Skin:  Negative for rash.   Neurological:  Negative for dizziness, weakness and headaches.   Psychiatric/Behavioral:  The patient is not nervous/anxious.          OBJECTIVE:   /80   Pulse 64   Temp 97.4  F (36.3  C) (Tympanic)   Resp 16   Ht 1.702 m (5' 7\")   Wt 79.6 kg (175 lb 6.4 oz)   SpO2 98%   Breastfeeding No   BMI 27.47 kg/m     Estimated body mass index is 27.47 kg/m  as calculated from the following:    Height as of this encounter: 1.702 m (5' 7\").    Weight as of this encounter: 79.6 kg (175 lb 6.4 oz).  Physical Exam  Constitutional:       General: She is not in acute distress.     Appearance: She is well-developed.   HENT:      " Head: Normocephalic.      Right Ear: Tympanic membrane and external ear normal.      Left Ear: Tympanic membrane and external ear normal.      Nose: Nose normal.      Mouth/Throat:      Mouth: Mucous membranes are moist.      Pharynx: Oropharynx is clear. No oropharyngeal exudate or posterior oropharyngeal erythema.   Eyes:      General:         Right eye: No discharge.         Left eye: No discharge.      Conjunctiva/sclera: Conjunctivae normal.      Pupils: Pupils are equal, round, and reactive to light.   Neck:      Thyroid: No thyromegaly.      Trachea: No tracheal deviation.   Cardiovascular:      Rate and Rhythm: Normal rate and regular rhythm.      Pulses: Normal pulses.      Heart sounds: Normal heart sounds, S1 normal and S2 normal. No murmur heard.     No friction rub. No gallop. No S3 or S4 sounds.   Pulmonary:      Effort: Pulmonary effort is normal. No respiratory distress.      Breath sounds: Normal breath sounds. No wheezing or rales.      Comments: Breast exam:  No masses palpable bilaterally.  No skin changes, tethering or axillary lymphadenopathy bilaterally.    Abdominal:      General: Bowel sounds are normal. There is no distension.      Palpations: Abdomen is soft. There is no mass.      Tenderness: There is no abdominal tenderness.   Genitourinary:     Comments: Pelvic/Rectal exams deferred per patient.  Musculoskeletal:         General: Normal range of motion.      Cervical back: Neck supple.   Lymphadenopathy:      Cervical: No cervical adenopathy.   Skin:     General: Skin is warm and dry.      Findings: No rash.   Neurological:      Mental Status: She is alert and oriented to person, place, and time.      Motor: No abnormal muscle tone.      Deep Tendon Reflexes: Reflexes are normal and symmetric.   Psychiatric:         Mood and Affect: Mood normal.         Thought Content: Thought content normal.         Judgment: Judgment normal.       ASSESSMENT/PLAN:   Assessment & Plan     ICD-10-CM     1. Health care maintenance  Z00.00 RSV VACCINE (ABRYSVO)     respiratory syncytial virus vaccine, bivalent (ABRYSVO) 120 MCG/0.5ML injection      2. Urinary incontinence, unspecified type  R32 UA reflex to Microscopic     Urine Culture Aerobic Bacterial     Physical Therapy Referral     UA reflex to Microscopic      3. Hypothyroidism, unspecified type  E03.9 levothyroxine (SYNTHROID/LEVOTHROID) 112 MCG tablet      4. Dysthymic disorder  F34.1 PARoxetine (PAXIL) 10 MG tablet         Mammogram is scheduled for 1/30/24.  DEXA last completed 2/21/23 and was normal at that time.  Pap Smear/HPV deferred due to age >65.  Colonoscopy last completed 1/25/21.  10 year follow up was recommended.  Abdominal aortic aneurysm screening was completed 2/21/23 and was normal.  Flu, covid, tdap, Shingrix and prevnar 20 are all up to date.  RSV vaccine updated today.  Fasting labs are already on order for Comprehensive Metabolic Profile, Complete Blood Count, lipid profile as well as TSH.  She is going to return to clinic another day to complete those.  Hepatitis C screening deferred due to low risk.  Urinalysis & urine culture ordered to be completed at the time of her fasting lab visit.   Also referred to Physical Therapy for pelvic floor strengthening to see if this helps.  Stable.  Levothyroxine refilled.  TSH is on order.  Stable.  She is considering weaning down/off of paxil at some point and discussed how she could go about that if desired.    Return in about 53 weeks (around 2/3/2025) for Annual Wellness Visit.      Patient has been advised of split billing requirements and indicates understanding: Yes      Counseling  Reviewed preventive health counseling, as reflected in patient instructions       Consider AAA screening for ages 65-75 and > 100 cig smoking history or family history of AAA       Regular exercise       Healthy diet/nutrition       Vision screening       Consider Hep C screening for all patients one time for  ages 18-79 years       Colorectal cancer screening       Osteoporosis prevention/bone health        She reports that she has never smoked. She has never used smokeless tobacco.          Signed Electronically by: Eulalia Delgado MD

## 2024-01-29 NOTE — PATIENT INSTRUCTIONS
Patient Education   Personalized Prevention Plan  You are due for the preventive services outlined below.  Your care team is available to assist you in scheduling these services.  If you have already completed any of these items, please share that information with your care team to update in your medical record.  Health Maintenance Due   Topic Date Due     Hepatitis C Screening  Never done     RSV VACCINE (Pregnancy & 60+) (1 - 1-dose 60+ series) Never done     Annual Wellness Visit  12/29/2023     Thyroid Function Lab  12/29/2023     Bladder Training: Care Instructions  Your Care Instructions     Bladder training is used to treat urge incontinence and stress incontinence. Urge incontinence means that the need to urinate comes on so fast that you can't get to a toilet in time. Stress incontinence means that you leak urine because of pressure on your bladder. For example, it may happen when you laugh, cough, or lift something heavy.  Bladder training can increase how long you can wait before you have to urinate. It can also help your bladder hold more urine. And it can give you better control over the urge to urinate.  It is important to remember that bladder training takes a few weeks to a few months to make a difference. You may not see results right away, but don't give up.  Follow-up care is a key part of your treatment and safety. Be sure to make and go to all appointments, and call your doctor if you are having problems. It's also a good idea to know your test results and keep a list of the medicines you take.  How can you care for yourself at home?  Work with your doctor to come up with a bladder training program that is right for you. You may use one or more of the following methods.  Delayed urination  In the beginning, try to keep from urinating for 5 minutes after you first feel the need to go.  While you wait, take deep, slow breaths to relax. Kegel exercises can also help you delay the need to go to the  "bathroom.  After some practice, when you can easily wait 5 minutes to urinate, try to wait 10 minutes before you urinate.  Slowly increase the waiting period until you are able to control when you have to urinate.  Scheduled urination  Empty your bladder when you first wake up in the morning.  Schedule times throughout the day when you will urinate.  Start by going to the bathroom every hour, even if you don't need to go.  Slowly increase the time between trips to the bathroom.  When you have found a schedule that works well for you, keep doing it.  If you wake up during the night and have to urinate, do it. Apply your schedule to waking hours only.  Kegel exercises  These tighten and strengthen pelvic muscles, which can help you control the flow of urine. (If doing these exercises causes pain, stop doing them and talk with your doctor.) To do Kegel exercises:  Squeeze your muscles as if you were trying not to pass gas. Or squeeze your muscles as if you were stopping the flow of urine. Your belly, legs, and buttocks shouldn't move.  Hold the squeeze for 3 seconds, then relax for 5 to 10 seconds.  Start with 3 seconds, then add 1 second each week until you are able to squeeze for 10 seconds.  Repeat the exercise 10 times a session. Do 3 to 8 sessions a day.  When should you call for help?  Watch closely for changes in your health, and be sure to contact your doctor if:    Your incontinence is getting worse.     You do not get better as expected.   Where can you learn more?  Go to https://www.[employer].net/patiented  Enter V684 in the search box to learn more about \"Bladder Training: Care Instructions.\"  Current as of: February 28, 2023               Content Version: 13.8    [id number] [employer].   Care instructions adapted under license by your healthcare professional. If you have questions about a medical condition or this instruction, always ask your healthcare professional. [employer] " disclaims any warranty or liability for your use of this information.

## 2024-01-30 ENCOUNTER — HOSPITAL ENCOUNTER (OUTPATIENT)
Dept: MAMMOGRAPHY | Facility: OTHER | Age: 69
Discharge: HOME | End: 2024-01-30
Attending: FAMILY MEDICINE | Admitting: FAMILY MEDICINE
Payer: COMMERCIAL

## 2024-01-30 DIAGNOSIS — Z12.31 VISIT FOR SCREENING MAMMOGRAM: ICD-10-CM

## 2024-01-30 PROCEDURE — 77063 BREAST TOMOSYNTHESIS BI: CPT

## 2024-02-02 ENCOUNTER — LAB (OUTPATIENT)
Dept: LAB | Facility: OTHER | Age: 69
End: 2024-02-02
Attending: FAMILY MEDICINE
Payer: COMMERCIAL

## 2024-02-02 DIAGNOSIS — R32 URINARY INCONTINENCE, UNSPECIFIED TYPE: ICD-10-CM

## 2024-02-02 DIAGNOSIS — E03.9 HYPOTHYROIDISM, UNSPECIFIED TYPE: ICD-10-CM

## 2024-02-02 DIAGNOSIS — E78.00 PURE HYPERCHOLESTEROLEMIA: ICD-10-CM

## 2024-02-02 DIAGNOSIS — Z13.0 SCREENING FOR DEFICIENCY ANEMIA: ICD-10-CM

## 2024-02-02 LAB
ALBUMIN SERPL BCG-MCNC: 4.3 G/DL (ref 3.5–5.2)
ALBUMIN UR-MCNC: NEGATIVE MG/DL
ALP SERPL-CCNC: 61 U/L (ref 40–150)
ALT SERPL W P-5'-P-CCNC: 23 U/L (ref 0–50)
ANION GAP SERPL CALCULATED.3IONS-SCNC: 8 MMOL/L (ref 7–15)
APPEARANCE UR: CLEAR
AST SERPL W P-5'-P-CCNC: 24 U/L (ref 0–45)
BASOPHILS # BLD AUTO: 0.1 10E3/UL (ref 0–0.2)
BASOPHILS NFR BLD AUTO: 1 %
BILIRUB SERPL-MCNC: 0.5 MG/DL
BILIRUB UR QL STRIP: NEGATIVE
BUN SERPL-MCNC: 17.8 MG/DL (ref 8–23)
CALCIUM SERPL-MCNC: 9.2 MG/DL (ref 8.8–10.2)
CHLORIDE SERPL-SCNC: 103 MMOL/L (ref 98–107)
CHOLEST SERPL-MCNC: 196 MG/DL
COLOR UR AUTO: ABNORMAL
CREAT SERPL-MCNC: 0.8 MG/DL (ref 0.51–0.95)
DEPRECATED HCO3 PLAS-SCNC: 29 MMOL/L (ref 22–29)
EGFRCR SERPLBLD CKD-EPI 2021: 81 ML/MIN/1.73M2
EOSINOPHIL # BLD AUTO: 0.2 10E3/UL (ref 0–0.7)
EOSINOPHIL NFR BLD AUTO: 3 %
ERYTHROCYTE [DISTWIDTH] IN BLOOD BY AUTOMATED COUNT: 12.6 % (ref 10–15)
FASTING STATUS PATIENT QL REPORTED: YES
GLUCOSE SERPL-MCNC: 105 MG/DL (ref 70–99)
GLUCOSE UR STRIP-MCNC: NEGATIVE MG/DL
HCT VFR BLD AUTO: 40.7 % (ref 35–47)
HDLC SERPL-MCNC: 69 MG/DL
HGB BLD-MCNC: 13.8 G/DL (ref 11.7–15.7)
HGB UR QL STRIP: ABNORMAL
IMM GRANULOCYTES # BLD: 0 10E3/UL
IMM GRANULOCYTES NFR BLD: 0 %
KETONES UR STRIP-MCNC: NEGATIVE MG/DL
LDLC SERPL CALC-MCNC: 109 MG/DL
LEUKOCYTE ESTERASE UR QL STRIP: ABNORMAL
LYMPHOCYTES # BLD AUTO: 2.7 10E3/UL (ref 0.8–5.3)
LYMPHOCYTES NFR BLD AUTO: 43 %
MCH RBC QN AUTO: 29.5 PG (ref 26.5–33)
MCHC RBC AUTO-ENTMCNC: 33.9 G/DL (ref 31.5–36.5)
MCV RBC AUTO: 87 FL (ref 78–100)
MONOCYTES # BLD AUTO: 0.5 10E3/UL (ref 0–1.3)
MONOCYTES NFR BLD AUTO: 8 %
MUCOUS THREADS #/AREA URNS LPF: PRESENT /LPF
NEUTROPHILS # BLD AUTO: 2.8 10E3/UL (ref 1.6–8.3)
NEUTROPHILS NFR BLD AUTO: 45 %
NITRATE UR QL: NEGATIVE
NONHDLC SERPL-MCNC: 127 MG/DL
NRBC # BLD AUTO: 0 10E3/UL
NRBC BLD AUTO-RTO: 0 /100
PH UR STRIP: 7.5 [PH] (ref 5–9)
PLATELET # BLD AUTO: 255 10E3/UL (ref 150–450)
POTASSIUM SERPL-SCNC: 4.7 MMOL/L (ref 3.4–5.3)
PROT SERPL-MCNC: 6.9 G/DL (ref 6.4–8.3)
RBC # BLD AUTO: 4.68 10E6/UL (ref 3.8–5.2)
RBC URINE: 11 /HPF
SODIUM SERPL-SCNC: 140 MMOL/L (ref 135–145)
SP GR UR STRIP: 1.02 (ref 1–1.03)
SQUAMOUS EPITHELIAL: 3 /HPF
TRIGL SERPL-MCNC: 92 MG/DL
TSH SERPL DL<=0.005 MIU/L-ACNC: 1.68 UIU/ML (ref 0.3–4.2)
UROBILINOGEN UR STRIP-MCNC: NORMAL MG/DL
WBC # BLD AUTO: 6.3 10E3/UL (ref 4–11)
WBC URINE: 5 /HPF

## 2024-02-02 PROCEDURE — 84443 ASSAY THYROID STIM HORMONE: CPT | Mod: ZL

## 2024-02-02 PROCEDURE — 81001 URINALYSIS AUTO W/SCOPE: CPT | Mod: ZL

## 2024-02-02 PROCEDURE — 85025 COMPLETE CBC W/AUTO DIFF WBC: CPT | Mod: ZL

## 2024-02-02 PROCEDURE — 80053 COMPREHEN METABOLIC PANEL: CPT | Mod: ZL

## 2024-02-02 PROCEDURE — 87086 URINE CULTURE/COLONY COUNT: CPT | Mod: ZL

## 2024-02-02 PROCEDURE — 36415 COLL VENOUS BLD VENIPUNCTURE: CPT | Mod: ZL

## 2024-02-02 PROCEDURE — 80061 LIPID PANEL: CPT | Mod: ZL

## 2024-02-03 LAB — BACTERIA UR CULT: NORMAL

## 2024-02-28 ENCOUNTER — THERAPY VISIT (OUTPATIENT)
Dept: PHYSICAL THERAPY | Facility: OTHER | Age: 69
End: 2024-02-28
Attending: FAMILY MEDICINE
Payer: COMMERCIAL

## 2024-02-28 DIAGNOSIS — R32 URINARY INCONTINENCE, UNSPECIFIED TYPE: ICD-10-CM

## 2024-02-28 PROCEDURE — 97110 THERAPEUTIC EXERCISES: CPT | Mod: GP

## 2024-02-28 PROCEDURE — 97161 PT EVAL LOW COMPLEX 20 MIN: CPT | Mod: GP

## 2024-02-28 NOTE — PROGRESS NOTES
PHYSICAL THERAPY EVALUATION  Type of Visit: Evaluation    See electronic medical record for Abuse and Falls Screening details.    Subjective       Presenting condition or subjective complaint: Urine leaking  Patient referred to PT with complaints of urinary incontinence, frequency and urgency that started this past summer after a yeast infection. With have dribbling with urgency when approaching the toilet, frequency as well. Controls any constipation with diet, drinks at least 64 oz of water daily, walks 5 miles daily.   Date of onset: 01/29/24    Relevant medical history:     Past Medical History:   Diagnosis Date    Hypothyroidism     No Comments Provided    Personal history of other medical treatment (CODE)     G2, P2       Prior diagnostic imaging/testing results: Other none   Prior therapy history for the same diagnosis, illness or injury: No      Prior Level of Function  Transfers: Independent  Ambulation: Independent    Living Environment  Social support: With a significant other or spouse   Type of home: House   Stairs to enter the home: No       Ramp: No   Stairs inside the home: Yes 15 Is there a railing: Yes   Help at home: None  Equipment owned:       Employment: No    Hobbies/Interests: walking  reading  traveling  gardening    Patient goals for therapy: not leak!    Pain assessment: Pain denied     Objective      PELVIC EVALUATION  ADDITIONAL HISTORY:    Bladder History:  Feels bladder filling:  yes  How long can you wait to urinate:  minimal  Gets up at night to urinate:    no  Can stop the flow of urine when urinating:  not always  Volume of urine usually released:   dribbles  Fluid intake per day:      64 oz water  Activities causing urine leak:    urgency         Discussed reason for referral regarding pelvic health needs and external/internal pelvic floor muscle examination with patient/guardian.  Opportunity provided to ask questions and verbal consent for assessment and intervention was  given.      POSTURE: WNL  LUMBAR SCREEN: AROM WNL  HIP SCREEN:  Strength: WNL     PELVIC/SI SCREEN:  WNL     PELVIC EXAM  External Visual Inspection:  With voluntary pelvic floor contraction: Perineal elevation  Relaxation of PFM: Yes    External Digital Palpation per Perineum: no tenderness    Internal Digital Palpation:  Per Vagina:  Digital Muscle Performance: P (Power): 2  E (Endurance): 9  R (Repetitions): 2  F (Fast Twitch): 5  Compensations: Adductors, Gluts      Pelvic Organ Prolapse:   Anterior: grade1      Assessment & Plan   CLINICAL IMPRESSIONS  Medical Diagnosis: R32 (ICD-10-CM) - Urinary incontinence, unspecified type    Treatment Diagnosis: muscle weakness   Impression/Assessment: Patient is a 68 year old female with mixed urinary incontinence complaints.  The following significant findings have been identified: Decreased strength and Impaired muscle performance. These impairments interfere with their ability to perform self care tasks, household mobility, and community mobility as compared to previous level of function.     Clinical Decision Making (Complexity):  Clinical Presentation: Stable/Uncomplicated  Clinical Presentation Rationale: based on medical and personal factors listed in PT evaluation  Clinical Decision Making (Complexity): Low complexity    PLAN OF CARE  Treatment Interventions:  Interventions: Manual Therapy, Neuromuscular Re-education, Therapeutic Activity, Therapeutic Exercise, Self-Care/Home Management    Long Term Goals     PT Goal 1  Goal Identifier: urgency  Goal Description: Patient to voice succes with urinary urgency control without leakage during day time at least 75%  Target Date: 05/27/24  PT Goal 2  Goal Identifier: MMT  Goal Description: Patient to have improved PFM strength of at least 4/5 to reduce urinary incontinence with movements, urgency.  Target Date: 05/27/24      Frequency of Treatment: 1-2 times per week  Duration of Treatment: up to 12 weeks    Recommended  Referrals to Other Professionals:  NA  Education Assessment:   Learner/Method: Patient;No Barriers to Learning    Risks and benefits of evaluation/treatment have been explained.   Patient/Family/caregiver agrees with Plan of Care.     Evaluation Time:     PT Eval, Low Complexity Minutes (88964): 15       Signing Clinician: Gladys Jacobs PT

## 2024-03-06 ENCOUNTER — THERAPY VISIT (OUTPATIENT)
Dept: PHYSICAL THERAPY | Facility: OTHER | Age: 69
End: 2024-03-06
Attending: FAMILY MEDICINE
Payer: COMMERCIAL

## 2024-03-06 DIAGNOSIS — R32 URINARY INCONTINENCE, UNSPECIFIED TYPE: Primary | ICD-10-CM

## 2024-03-06 PROCEDURE — 97110 THERAPEUTIC EXERCISES: CPT | Mod: GP

## 2024-03-12 ENCOUNTER — THERAPY VISIT (OUTPATIENT)
Dept: PHYSICAL THERAPY | Facility: OTHER | Age: 69
End: 2024-03-12
Attending: FAMILY MEDICINE
Payer: COMMERCIAL

## 2024-03-12 DIAGNOSIS — R32 URINARY INCONTINENCE, UNSPECIFIED TYPE: Primary | ICD-10-CM

## 2024-03-12 PROCEDURE — 97110 THERAPEUTIC EXERCISES: CPT | Mod: GP

## 2024-03-20 ENCOUNTER — THERAPY VISIT (OUTPATIENT)
Dept: PHYSICAL THERAPY | Facility: OTHER | Age: 69
End: 2024-03-20
Attending: FAMILY MEDICINE
Payer: COMMERCIAL

## 2024-03-20 DIAGNOSIS — R32 URINARY INCONTINENCE, UNSPECIFIED TYPE: Primary | ICD-10-CM

## 2024-03-20 PROCEDURE — 97110 THERAPEUTIC EXERCISES: CPT | Mod: GP

## 2024-03-26 ENCOUNTER — THERAPY VISIT (OUTPATIENT)
Dept: PHYSICAL THERAPY | Facility: OTHER | Age: 69
End: 2024-03-26
Attending: FAMILY MEDICINE
Payer: COMMERCIAL

## 2024-03-26 DIAGNOSIS — R32 URINARY INCONTINENCE, UNSPECIFIED TYPE: Primary | ICD-10-CM

## 2024-03-26 PROCEDURE — 97110 THERAPEUTIC EXERCISES: CPT | Mod: GP

## 2024-06-24 NOTE — PROGRESS NOTES
DISCHARGE  Reason for Discharge: Patient has met all goals.    Equipment Issued: NA    Discharge Plan: Patient to continue home program.    Referring Provider:  Eulalia Delgado         03/26/24 0500   Appointment Info   Signing clinician's name / credentials Gladys Jacobs, PT, CLT, CAPP   Visits Used 5   Medical Diagnosis R32 (ICD-10-CM) - Urinary incontinence, unspecified type   PT Tx Diagnosis muscle weakness   Progress Note/Certification   Onset of illness/injury or Date of Surgery 01/29/24   Therapy Frequency 1-2 times per week   Predicted Duration up to 12 weeks   GOALS   PT Goals 2;3   PT Goal 1   Goal Identifier urgency   Goal Description Patient to voice succes with urinary urgency control without leakage during day time at least 75%   Target Date 05/27/24   Date Met 03/26/24   PT Goal 2   Goal Identifier MMT   Goal Description Patient to have improved PFM strength of at least 4/5 to reduce urinary incontinence with movements, urgency.   Target Date 05/27/24   Date Met 03/26/24   Subjective Report   Subjective Report Continues to work on HEP. Feels she continues to improve with control, gluteal use   Treatment Interventions (PT)   Interventions Therapeutic Procedure/Exercise   Therapeutic Procedure/Exercise   Therapeutic Procedures: strength, endurance, ROM, flexibility minutes (24177) 25   Ther Proc 1 education, strengthening   Ther Proc 1 - Details PFM strengthening with cues for correct use of gluteals during mini squats, 1/2 kneel, floor <>stand, mini trampoline (had one small leak when she lost focus)   Patient Response/Progress tolerated well   Education   Learner/Method Patient;No Barriers to Learning   Plan   Home program PFM strengthening hold 5x5x5, urgency control   Plan for next session will recheck in 6 weeks   Total Session Time   Timed Code Treatment Minutes 25   Total Treatment Time (sum of timed and untimed services) 25

## 2025-02-03 DIAGNOSIS — E78.00 PURE HYPERCHOLESTEROLEMIA: ICD-10-CM

## 2025-02-05 RX ORDER — SIMVASTATIN 20 MG
20 TABLET ORAL AT BEDTIME
Qty: 90 TABLET | Refills: 4 | Status: SHIPPED | OUTPATIENT
Start: 2025-02-05

## 2025-02-05 NOTE — TELEPHONE ENCOUNTER
[facility] sent Rx request for the following:      Requested Prescriptions   Pending Prescriptions Disp Refills    simvastatin (ZOCOR) 20 MG tablet [Pharmacy Med Name: simvastatin 20 mg tablet] 90 tablet 4     Sig: Take 1 tablet (20 mg) by mouth At Bedtime       Antihyperlipidemic agents Failed - 2/5/2025  7:49 AM        Failed - LDL on file in the past 12 months        Failed - Recent (12 mo) or future (90 days) visit within the authorizing provider's specialty     The patient must have completed an in-person or virtual visit within the past 12 months or has a future visit scheduled within the next 90 days with the authorizing provider s specialty.  Urgent care and e-visits do not qualify as an office visit for this protocol.         Last Prescription Date:   1/8/24  Last Fill Qty/Refills:         90, R-4    Last Office Visit:              1/29/24   Future Office visit:            4/4/25    Routing refill request to provider for review/approval because:  Labs not current:  LDL    Alyssa Riley RN on 2/5/2025 at 7:50 AM

## 2025-04-08 ENCOUNTER — LAB (OUTPATIENT)
Dept: LAB | Facility: OTHER | Age: 70
End: 2025-04-08
Attending: FAMILY MEDICINE
Payer: COMMERCIAL

## 2025-04-08 DIAGNOSIS — E78.5 HYPERLIPIDEMIA, UNSPECIFIED HYPERLIPIDEMIA TYPE: ICD-10-CM

## 2025-04-08 DIAGNOSIS — Z13.0 SCREENING FOR DEFICIENCY ANEMIA: ICD-10-CM

## 2025-04-08 DIAGNOSIS — E03.9 HYPOTHYROIDISM, UNSPECIFIED TYPE: ICD-10-CM

## 2025-04-08 LAB
ALBUMIN SERPL BCG-MCNC: 4.3 G/DL (ref 3.5–5.2)
ALP SERPL-CCNC: 70 U/L (ref 40–150)
ALT SERPL W P-5'-P-CCNC: 18 U/L (ref 0–50)
ANION GAP SERPL CALCULATED.3IONS-SCNC: 10 MMOL/L (ref 7–15)
AST SERPL W P-5'-P-CCNC: 24 U/L (ref 0–45)
BASOPHILS # BLD AUTO: 0.1 10E3/UL (ref 0–0.2)
BASOPHILS NFR BLD AUTO: 1 %
BILIRUB SERPL-MCNC: 0.5 MG/DL
BUN SERPL-MCNC: 14 MG/DL (ref 8–23)
CALCIUM SERPL-MCNC: 9.4 MG/DL (ref 8.8–10.4)
CHLORIDE SERPL-SCNC: 104 MMOL/L (ref 98–107)
CHOLEST SERPL-MCNC: 180 MG/DL
CREAT SERPL-MCNC: 0.74 MG/DL (ref 0.51–0.95)
EGFRCR SERPLBLD CKD-EPI 2021: 88 ML/MIN/1.73M2
EOSINOPHIL # BLD AUTO: 0.2 10E3/UL (ref 0–0.7)
EOSINOPHIL NFR BLD AUTO: 4 %
ERYTHROCYTE [DISTWIDTH] IN BLOOD BY AUTOMATED COUNT: 12.5 % (ref 10–15)
FASTING STATUS PATIENT QL REPORTED: ABNORMAL
FASTING STATUS PATIENT QL REPORTED: NORMAL
GLUCOSE SERPL-MCNC: 101 MG/DL (ref 70–99)
HCO3 SERPL-SCNC: 27 MMOL/L (ref 22–29)
HCT VFR BLD AUTO: 39 % (ref 35–47)
HDLC SERPL-MCNC: 76 MG/DL
HGB BLD-MCNC: 13.2 G/DL (ref 11.7–15.7)
IMM GRANULOCYTES # BLD: 0 10E3/UL
IMM GRANULOCYTES NFR BLD: 0 %
LDLC SERPL CALC-MCNC: 88 MG/DL
LYMPHOCYTES # BLD AUTO: 2.3 10E3/UL (ref 0.8–5.3)
LYMPHOCYTES NFR BLD AUTO: 35 %
MCH RBC QN AUTO: 29.2 PG (ref 26.5–33)
MCHC RBC AUTO-ENTMCNC: 33.8 G/DL (ref 31.5–36.5)
MCV RBC AUTO: 86 FL (ref 78–100)
MONOCYTES # BLD AUTO: 0.6 10E3/UL (ref 0–1.3)
MONOCYTES NFR BLD AUTO: 9 %
NEUTROPHILS # BLD AUTO: 3.4 10E3/UL (ref 1.6–8.3)
NEUTROPHILS NFR BLD AUTO: 51 %
NONHDLC SERPL-MCNC: 104 MG/DL
NRBC # BLD AUTO: 0 10E3/UL
NRBC BLD AUTO-RTO: 0 /100
PLATELET # BLD AUTO: 262 10E3/UL (ref 150–450)
POTASSIUM SERPL-SCNC: 4.6 MMOL/L (ref 3.4–5.3)
PROT SERPL-MCNC: 7.1 G/DL (ref 6.4–8.3)
RBC # BLD AUTO: 4.52 10E6/UL (ref 3.8–5.2)
SODIUM SERPL-SCNC: 141 MMOL/L (ref 135–145)
TRIGL SERPL-MCNC: 80 MG/DL
TSH SERPL DL<=0.005 MIU/L-ACNC: 0.83 UIU/ML (ref 0.3–4.2)
WBC # BLD AUTO: 6.6 10E3/UL (ref 4–11)

## 2025-04-08 PROCEDURE — 85041 AUTOMATED RBC COUNT: CPT | Mod: ZL

## 2025-04-08 PROCEDURE — 82465 ASSAY BLD/SERUM CHOLESTEROL: CPT | Mod: ZL

## 2025-04-08 PROCEDURE — 84443 ASSAY THYROID STIM HORMONE: CPT | Mod: ZL

## 2025-04-08 PROCEDURE — 36415 COLL VENOUS BLD VENIPUNCTURE: CPT | Mod: ZL

## 2025-04-08 PROCEDURE — 85004 AUTOMATED DIFF WBC COUNT: CPT | Mod: ZL

## 2025-04-08 PROCEDURE — 84155 ASSAY OF PROTEIN SERUM: CPT | Mod: ZL

## 2025-04-08 PROCEDURE — 84450 TRANSFERASE (AST) (SGOT): CPT | Mod: ZL

## 2025-04-28 ENCOUNTER — MYC REFILL (OUTPATIENT)
Dept: FAMILY MEDICINE | Facility: OTHER | Age: 70
End: 2025-04-28
Payer: COMMERCIAL

## 2025-04-28 DIAGNOSIS — E03.9 HYPOTHYROIDISM, UNSPECIFIED TYPE: ICD-10-CM

## 2025-04-30 RX ORDER — LEVOTHYROXINE SODIUM 112 UG/1
112 TABLET ORAL DAILY
Qty: 90 TABLET | Refills: 4 | OUTPATIENT
Start: 2025-04-30

## 2025-04-30 NOTE — TELEPHONE ENCOUNTER
Patient sent message via [employer] requesting the following:      Requested Prescriptions   Pending Prescriptions Disp Refills    levothyroxine (SYNTHROID/LEVOTHROID) 112 MCG tablet 90 tablet 4     Sig: Take 1 tablet (112 mcg) by mouth daily.       Thyroid Protocol Passed - 4/30/2025 11:54 AM        Last Prescription Date:   4/4/25  Last Fill Qty/Refills:         90, R-4      [employer] message sent to patient. See other encounter.     BRUCE CAMACHO RN on 4/30/2025 at 11:57 AM

## (undated) DEVICE — SOL WATER 1500ML

## (undated) DEVICE — TUBING SUCTION 10'X3/16" N510

## (undated) DEVICE — ENDO KIT COMPLIANCE DYKENDOCMPLY

## (undated) DEVICE — ENDO BRUSH CHANNEL MASTER CLEANING 2-4.2MM BW-412T

## (undated) DEVICE — ENDO FORCEP ENDOJAW BIOPSY 2.8MMX230CM FB-220U

## (undated) DEVICE — SUCTION MANIFOLD NEPTUNE 2 SYS 4 PORT 0702-020-000

## (undated) RX ORDER — PROPOFOL 10 MG/ML
INJECTION, EMULSION INTRAVENOUS
Status: DISPENSED
Start: 2021-01-25